# Patient Record
Sex: FEMALE | Race: WHITE | Employment: UNEMPLOYED | URBAN - METROPOLITAN AREA
[De-identification: names, ages, dates, MRNs, and addresses within clinical notes are randomized per-mention and may not be internally consistent; named-entity substitution may affect disease eponyms.]

---

## 2017-04-02 NOTE — ED NOTES
Spoke with pt and gave her referrals that [facility] had sent over to us. Also suggested for patient to look into joining an [employer] [place] [id number] group/hopefully a female group. Pt awake, alert, states she does not want to drink anymore. Discharge to home.

## 2017-04-02 NOTE — ED NOTES
Pt states she feels much less anxious. Family in room. Spoke with pt about possible out patient treatment and she says she would like information about this. Dr. Estella khan.

## 2017-04-02 NOTE — ED NOTES
Pt states she feels anxious, cant eat, sleep. Pt states she drinks daily, has not had a drink for 4 days. Pt denies any suicidal or homicidal thoughts. Pt states she wants help to stop drinking. Pt tearful.

## 2017-04-02 NOTE — ED PROVIDER NOTES
Patient Seen in: THE [facility] Emergency Department In [place]    History   Patient presents with:   Anxiety/Panic attack (neurologic)    Stated Complaint: Panic Attack    HPI    27-year-old female presents to the emergency department stating that she has bee anxious and shaky lying in a gurney. Vital signs were reviewed per nurse's notes. HEENT: Normocephalic atraumatic. Anicteric sclera. Oral mucosa is moist.  Oropharynx is normal.  Neck: No adenopathy or thyromegaly. Lungs are clear to auscultation.   He CULTURE, ROUTINE   CBC W/ DIFFERENTIAL    intravenous access was obtained and the patient was treated with IV fluids and Ativan. I did speak with [facility] and they sent a list of local referrals for the patient.   Because she is unfunded and CI

## 2017-04-02 NOTE — ED INITIAL ASSESSMENT (HPI)
Pt states she is a daily drinker for the past 10 months. Pt states she has been drinking since she was 15. Pt states she has not had a drink for the past 4 days and feels very anxious, shaky, cannot sleep.

## 2017-05-13 NOTE — ED PROVIDER NOTES
Patient Seen in: THE [facility] Emergency Department In [place]    History   Patient presents with:   Anxiety/Panic attack (neurologic)    Stated Complaint: anxiety    HPI    Patient is a 45-year-old female, presenting for evaluation after drinking yesterday e are no rashes. EXTREMITIES: The patient moves all 4 extremities freely. No cyanosis, clubbing, or edema. NEUROLOGIC: The patient is awake, alert, and oriented x3. Cranial nerves are grossly intact.  There is no gross motor or sensory deficits identifie

## 2017-05-13 NOTE — ED INITIAL ASSESSMENT (HPI)
Pt c/o feeling anxious. Sts she was here 2 months ago for same. Trigger both times was drinking the night before. C/O headache and \"feeling foggy\".

## 2022-08-21 NOTE — ED NOTES
Met with pt to discuss options for detox and treatment. Pt outside of [facility] network, but discussed with pt referrals placed in discharge instructions. Pt confirmed understanding. Pt reports history of seizures from withdrawal, and reports taking approx 10mg of xanax daily for approx past 5 years.  Pt denies history of treatment in the past.

## 2022-08-21 NOTE — ED INITIAL ASSESSMENT (HPI)
Pt states \"I want to get off of xanax\". Reports taking xanax for the last 3 years. Last took xanax last night. Pt was also treated in [facility] in April for xanax withdrawal, was discharged with ativan.    Pt reports feeling anxious

## 2023-01-31 ENCOUNTER — HOSPITAL ENCOUNTER (EMERGENCY)
Age: 28
Discharge: HOME | End: 2023-01-31
Attending: EMERGENCY MEDICINE
Payer: COMMERCIAL

## 2023-01-31 VITALS
SYSTOLIC BLOOD PRESSURE: 108 MMHG | HEIGHT: 67 IN | BODY MASS INDEX: 21.97 KG/M2 | WEIGHT: 140 LBS | DIASTOLIC BLOOD PRESSURE: 67 MMHG | TEMPERATURE: 98 F | RESPIRATION RATE: 16 BRPM | HEART RATE: 70 BPM | OXYGEN SATURATION: 99 %

## 2023-01-31 DIAGNOSIS — R11.2 NAUSEA AND VOMITING, UNSPECIFIED VOMITING TYPE: ICD-10-CM

## 2023-01-31 DIAGNOSIS — N30.00 ACUTE CYSTITIS WITHOUT HEMATURIA: Primary | ICD-10-CM

## 2023-01-31 LAB
ALBUMIN SERPL-MCNC: 3.8 G/DL (ref 3.4–5)
ALBUMIN/GLOB SERPL: 1.3 {RATIO} (ref 1–2)
ALP LIVER SERPL-CCNC: 55 U/L
ALT SERPL-CCNC: 36 U/L
ANION GAP SERPL CALC-SCNC: 6 MMOL/L (ref 0–18)
AST SERPL-CCNC: 45 U/L (ref 15–37)
B-HCG UR QL: NEGATIVE
BASOPHILS # BLD AUTO: 0.01 X10(3) UL (ref 0–0.2)
BASOPHILS NFR BLD AUTO: 0.3 %
BILIRUB SERPL-MCNC: 0.8 MG/DL (ref 0.1–2)
BILIRUB UR QL STRIP.AUTO: NEGATIVE
BUN BLD-MCNC: 10 MG/DL (ref 7–18)
CALCIUM BLD-MCNC: 8.9 MG/DL (ref 8.5–10.1)
CHLORIDE SERPL-SCNC: 103 MMOL/L (ref 98–112)
CO2 SERPL-SCNC: 29 MMOL/L (ref 21–32)
COLOR UR AUTO: YELLOW
CREAT BLD-MCNC: 0.93 MG/DL
EOSINOPHIL # BLD AUTO: 0.03 X10(3) UL (ref 0–0.7)
EOSINOPHIL NFR BLD AUTO: 0.9 %
ERYTHROCYTE [DISTWIDTH] IN BLOOD BY AUTOMATED COUNT: 12.6 %
GFR SERPLBLD BASED ON 1.73 SQ M-ARVRAT: 87 ML/MIN/1.73M2 (ref 60–?)
GLOBULIN PLAS-MCNC: 2.9 G/DL (ref 2.8–4.4)
GLUCOSE BLD-MCNC: 88 MG/DL (ref 70–99)
GLUCOSE UR STRIP.AUTO-MCNC: NEGATIVE MG/DL
HCT VFR BLD AUTO: 34.5 %
HGB BLD-MCNC: 11.9 G/DL
IMM GRANULOCYTES # BLD AUTO: 0 X10(3) UL (ref 0–1)
IMM GRANULOCYTES NFR BLD: 0 %
KETONES UR STRIP.AUTO-MCNC: 40 MG/DL
LYMPHOCYTES # BLD AUTO: 1.74 X10(3) UL (ref 1–4)
LYMPHOCYTES NFR BLD AUTO: 52.9 %
MCH RBC QN AUTO: 31.6 PG (ref 26–34)
MCHC RBC AUTO-ENTMCNC: 34.5 G/DL (ref 31–37)
MCV RBC AUTO: 91.8 FL
MONOCYTES # BLD AUTO: 0.33 X10(3) UL (ref 0.1–1)
MONOCYTES NFR BLD AUTO: 10 %
NEUTROPHILS # BLD AUTO: 1.18 X10 (3) UL (ref 1.5–7.7)
NEUTROPHILS # BLD AUTO: 1.18 X10(3) UL (ref 1.5–7.7)
NEUTROPHILS NFR BLD AUTO: 35.9 %
NITRITE UR QL STRIP.AUTO: POSITIVE
OSMOLALITY SERPL CALC.SUM OF ELEC: 284 MOSM/KG (ref 275–295)
PH UR STRIP.AUTO: 7 [PH] (ref 5–8)
PLATELET # BLD AUTO: 171 10(3)UL (ref 150–450)
POTASSIUM SERPL-SCNC: 3.4 MMOL/L (ref 3.5–5.1)
PROT SERPL-MCNC: 6.7 G/DL (ref 6.4–8.2)
RBC # BLD AUTO: 3.76 X10(6)UL
SODIUM SERPL-SCNC: 138 MMOL/L (ref 136–145)
SP GR UR STRIP.AUTO: 1.02 (ref 1–1.03)
UROBILINOGEN UR STRIP.AUTO-MCNC: 1 MG/DL
WBC # BLD AUTO: 3.3 X10(3) UL (ref 4–11)

## 2023-01-31 PROCEDURE — 87086 URINE CULTURE/COLONY COUNT: CPT | Performed by: EMERGENCY MEDICINE

## 2023-01-31 PROCEDURE — 80053 COMPREHEN METABOLIC PANEL: CPT

## 2023-01-31 PROCEDURE — 87088 URINE BACTERIA CULTURE: CPT | Performed by: EMERGENCY MEDICINE

## 2023-01-31 PROCEDURE — 81001 URINALYSIS AUTO W/SCOPE: CPT | Performed by: EMERGENCY MEDICINE

## 2023-01-31 PROCEDURE — 99284 EMERGENCY DEPT VISIT MOD MDM: CPT

## 2023-01-31 PROCEDURE — 81015 MICROSCOPIC EXAM OF URINE: CPT | Performed by: EMERGENCY MEDICINE

## 2023-01-31 PROCEDURE — 96365 THER/PROPH/DIAG IV INF INIT: CPT

## 2023-01-31 PROCEDURE — 87430 STREP A AG IA: CPT | Performed by: EMERGENCY MEDICINE

## 2023-01-31 PROCEDURE — 87186 SC STD MICRODIL/AGAR DIL: CPT | Performed by: EMERGENCY MEDICINE

## 2023-01-31 PROCEDURE — 80053 COMPREHEN METABOLIC PANEL: CPT | Performed by: EMERGENCY MEDICINE

## 2023-01-31 PROCEDURE — 85025 COMPLETE CBC W/AUTO DIFF WBC: CPT

## 2023-01-31 PROCEDURE — 96361 HYDRATE IV INFUSION ADD-ON: CPT

## 2023-01-31 PROCEDURE — 81025 URINE PREGNANCY TEST: CPT

## 2023-01-31 PROCEDURE — 96375 TX/PRO/DX INJ NEW DRUG ADDON: CPT

## 2023-01-31 PROCEDURE — 85025 COMPLETE CBC W/AUTO DIFF WBC: CPT | Performed by: EMERGENCY MEDICINE

## 2023-01-31 RX ORDER — ONDANSETRON 4 MG/1
4 TABLET, ORALLY DISINTEGRATING ORAL EVERY 4 HOURS PRN
Qty: 10 TABLET | Refills: 0 | Status: SHIPPED | OUTPATIENT
Start: 2023-01-31 | End: 2023-02-07

## 2023-01-31 RX ORDER — AZITHROMYCIN 250 MG/1
1000 TABLET, FILM COATED ORAL ONCE
Status: COMPLETED | OUTPATIENT
Start: 2023-01-31 | End: 2023-01-31

## 2023-01-31 RX ORDER — CEPHALEXIN 500 MG/1
500 CAPSULE ORAL 3 TIMES DAILY
Qty: 15 CAPSULE | Refills: 0 | Status: SHIPPED | OUTPATIENT
Start: 2023-01-31 | End: 2023-02-05

## 2023-01-31 RX ORDER — ONDANSETRON 2 MG/ML
4 INJECTION INTRAMUSCULAR; INTRAVENOUS ONCE
Status: COMPLETED | OUTPATIENT
Start: 2023-01-31 | End: 2023-01-31

## 2023-01-31 NOTE — ED INITIAL ASSESSMENT (HPI)
Pt in er for c/o nausea, vomiting, generalizes body aches today, pt reports taking valium, zoloft, norco, and ibuprofen for pain control

## 2023-02-11 ENCOUNTER — HOSPITAL ENCOUNTER (EMERGENCY)
Age: 28
Discharge: HOME | End: 2023-02-11
Attending: EMERGENCY MEDICINE
Payer: COMMERCIAL

## 2023-02-11 VITALS
HEART RATE: 84 BPM | HEIGHT: 67 IN | OXYGEN SATURATION: 98 % | DIASTOLIC BLOOD PRESSURE: 88 MMHG | TEMPERATURE: 98 F | BODY MASS INDEX: 21.97 KG/M2 | WEIGHT: 140 LBS | SYSTOLIC BLOOD PRESSURE: 124 MMHG | RESPIRATION RATE: 16 BRPM

## 2023-02-11 DIAGNOSIS — H81.10 BPPV (BENIGN PAROXYSMAL POSITIONAL VERTIGO), UNSPECIFIED LATERALITY: Primary | ICD-10-CM

## 2023-02-11 LAB
ALBUMIN SERPL-MCNC: 4 G/DL (ref 3.4–5)
ALBUMIN/GLOB SERPL: 1.2 {RATIO} (ref 1–2)
ALP LIVER SERPL-CCNC: 54 U/L
ALT SERPL-CCNC: 105 U/L
ANION GAP SERPL CALC-SCNC: 9 MMOL/L (ref 0–18)
AST SERPL-CCNC: 101 U/L (ref 15–37)
BASOPHILS # BLD AUTO: 0.02 X10(3) UL (ref 0–0.2)
BASOPHILS NFR BLD AUTO: 0.4 %
BILIRUB SERPL-MCNC: 0.5 MG/DL (ref 0.1–2)
BILIRUB UR QL STRIP.AUTO: NEGATIVE
BUN BLD-MCNC: 5 MG/DL (ref 7–18)
CALCIUM BLD-MCNC: 9.2 MG/DL (ref 8.5–10.1)
CHLORIDE SERPL-SCNC: 108 MMOL/L (ref 98–112)
CO2 SERPL-SCNC: 21 MMOL/L (ref 21–32)
COLOR UR AUTO: YELLOW
CREAT BLD-MCNC: 0.81 MG/DL
EOSINOPHIL # BLD AUTO: 0.01 X10(3) UL (ref 0–0.7)
EOSINOPHIL NFR BLD AUTO: 0.2 %
ERYTHROCYTE [DISTWIDTH] IN BLOOD BY AUTOMATED COUNT: 13 %
GFR SERPLBLD BASED ON 1.73 SQ M-ARVRAT: 103 ML/MIN/1.73M2 (ref 60–?)
GLOBULIN PLAS-MCNC: 3.4 G/DL (ref 2.8–4.4)
GLUCOSE BLD-MCNC: 98 MG/DL (ref 70–99)
GLUCOSE UR STRIP.AUTO-MCNC: NEGATIVE MG/DL
HCT VFR BLD AUTO: 39.5 %
HGB BLD-MCNC: 13.7 G/DL
IMM GRANULOCYTES # BLD AUTO: 0 X10(3) UL (ref 0–1)
IMM GRANULOCYTES NFR BLD: 0 %
LEUKOCYTE ESTERASE UR QL STRIP.AUTO: NEGATIVE
LYMPHOCYTES # BLD AUTO: 1.21 X10(3) UL (ref 1–4)
LYMPHOCYTES NFR BLD AUTO: 26 %
MCH RBC QN AUTO: 31.9 PG (ref 26–34)
MCHC RBC AUTO-ENTMCNC: 34.7 G/DL (ref 31–37)
MCV RBC AUTO: 91.9 FL
MONOCYTES # BLD AUTO: 0.48 X10(3) UL (ref 0.1–1)
MONOCYTES NFR BLD AUTO: 10.3 %
NEUTROPHILS # BLD AUTO: 2.94 X10 (3) UL (ref 1.5–7.7)
NEUTROPHILS # BLD AUTO: 2.94 X10(3) UL (ref 1.5–7.7)
NEUTROPHILS NFR BLD AUTO: 63.1 %
NITRITE UR QL STRIP.AUTO: NEGATIVE
OSMOLALITY SERPL CALC.SUM OF ELEC: 283 MOSM/KG (ref 275–295)
PH UR STRIP.AUTO: 7 [PH] (ref 5–8)
PLATELET # BLD AUTO: 187 10(3)UL (ref 150–450)
POTASSIUM SERPL-SCNC: 3.9 MMOL/L (ref 3.5–5.1)
PROT SERPL-MCNC: 7.4 G/DL (ref 6.4–8.2)
RBC # BLD AUTO: 4.3 X10(6)UL
SODIUM SERPL-SCNC: 138 MMOL/L (ref 136–145)
SP GR UR STRIP.AUTO: 1.02 (ref 1–1.03)
UROBILINOGEN UR STRIP.AUTO-MCNC: 0.2 MG/DL
WBC # BLD AUTO: 4.7 X10(3) UL (ref 4–11)

## 2023-02-11 PROCEDURE — 93010 ELECTROCARDIOGRAM REPORT: CPT

## 2023-02-11 PROCEDURE — 99284 EMERGENCY DEPT VISIT MOD MDM: CPT

## 2023-02-11 PROCEDURE — 93005 ELECTROCARDIOGRAM TRACING: CPT

## 2023-02-11 PROCEDURE — 80053 COMPREHEN METABOLIC PANEL: CPT | Performed by: EMERGENCY MEDICINE

## 2023-02-11 PROCEDURE — 96375 TX/PRO/DX INJ NEW DRUG ADDON: CPT

## 2023-02-11 PROCEDURE — 81015 MICROSCOPIC EXAM OF URINE: CPT | Performed by: EMERGENCY MEDICINE

## 2023-02-11 PROCEDURE — 85025 COMPLETE CBC W/AUTO DIFF WBC: CPT | Performed by: EMERGENCY MEDICINE

## 2023-02-11 PROCEDURE — 81001 URINALYSIS AUTO W/SCOPE: CPT | Performed by: EMERGENCY MEDICINE

## 2023-02-11 PROCEDURE — 96374 THER/PROPH/DIAG INJ IV PUSH: CPT

## 2023-02-11 PROCEDURE — 96361 HYDRATE IV INFUSION ADD-ON: CPT

## 2023-02-11 RX ORDER — MECLIZINE HYDROCHLORIDE 25 MG/1
25 TABLET ORAL 3 TIMES DAILY PRN
Qty: 20 TABLET | Refills: 0 | Status: SHIPPED | OUTPATIENT
Start: 2023-02-11 | End: 2023-02-20

## 2023-02-11 RX ORDER — HYDROXYZINE HYDROCHLORIDE 25 MG/1
TABLET, FILM COATED ORAL EVERY 6 HOURS PRN
Qty: 20 TABLET | Refills: 0 | Status: SHIPPED | OUTPATIENT
Start: 2023-02-11 | End: 2023-02-20

## 2023-02-11 RX ORDER — MECLIZINE HYDROCHLORIDE 25 MG/1
25 TABLET ORAL ONCE
Status: COMPLETED | OUTPATIENT
Start: 2023-02-11 | End: 2023-02-11

## 2023-02-11 RX ORDER — METOCLOPRAMIDE HYDROCHLORIDE 5 MG/ML
10 INJECTION INTRAMUSCULAR; INTRAVENOUS ONCE
Status: COMPLETED | OUTPATIENT
Start: 2023-02-11 | End: 2023-02-11

## 2023-02-11 RX ORDER — METOCLOPRAMIDE 10 MG/1
10 TABLET ORAL 3 TIMES DAILY PRN
Qty: 20 TABLET | Refills: 0 | Status: SHIPPED | OUTPATIENT
Start: 2023-02-11 | End: 2023-02-20

## 2023-02-11 RX ORDER — DIPHENHYDRAMINE HYDROCHLORIDE 50 MG/ML
25 INJECTION INTRAMUSCULAR; INTRAVENOUS ONCE
Status: COMPLETED | OUTPATIENT
Start: 2023-02-11 | End: 2023-02-11

## 2023-02-11 RX ORDER — PROCHLORPERAZINE MALEATE 10 MG
10 TABLET ORAL ONCE
Status: COMPLETED | OUTPATIENT
Start: 2023-02-11 | End: 2023-02-11

## 2023-02-11 NOTE — ED INITIAL ASSESSMENT (HPI)
Patient to er with c/o dizziness and vomiting. Patient states that they stopped her Keppra about 1 month ago, started her on Zoloft- was on it for 1 month stopped for 2 weeks and just started taking it again.  Patient states that she also took Xanax PTA

## 2023-02-12 LAB
ATRIAL RATE: 85 BPM
P AXIS: 48 DEGREES
P-R INTERVAL: 122 MS
Q-T INTERVAL: 362 MS
QRS DURATION: 62 MS
QTC CALCULATION (BEZET): 430 MS
R AXIS: 72 DEGREES
T AXIS: 76 DEGREES
VENTRICULAR RATE: 85 BPM

## 2023-02-13 PROBLEM — F13.20 SEDATIVE, HYPNOTIC OR ANXIOLYTIC USE DISORDER, SEVERE, DEPENDENCE (HCC): Status: ACTIVE | Noted: 2023-02-13

## 2023-04-04 ENCOUNTER — APPOINTMENT (OUTPATIENT)
Dept: CT IMAGING | Age: 28
End: 2023-04-04
Attending: EMERGENCY MEDICINE
Payer: COMMERCIAL

## 2023-04-04 ENCOUNTER — HOSPITAL ENCOUNTER (EMERGENCY)
Age: 28
Discharge: HOME | End: 2023-04-04
Attending: EMERGENCY MEDICINE
Payer: COMMERCIAL

## 2023-04-04 VITALS
WEIGHT: 140 LBS | TEMPERATURE: 98 F | OXYGEN SATURATION: 99 % | HEIGHT: 67 IN | BODY MASS INDEX: 21.97 KG/M2 | RESPIRATION RATE: 18 BRPM | SYSTOLIC BLOOD PRESSURE: 116 MMHG | DIASTOLIC BLOOD PRESSURE: 85 MMHG | HEART RATE: 88 BPM

## 2023-04-04 DIAGNOSIS — S09.8XXA BLUNT HEAD INJURY, INITIAL ENCOUNTER: ICD-10-CM

## 2023-04-04 DIAGNOSIS — R42 DIZZINESS: Primary | ICD-10-CM

## 2023-04-04 PROBLEM — F19.90 SUBSTANCE USE DISORDER: Status: ACTIVE | Noted: 2023-04-04

## 2023-04-04 LAB
ALBUMIN SERPL-MCNC: 4 G/DL (ref 3.4–5)
ALBUMIN/GLOB SERPL: 1.2 {RATIO} (ref 1–2)
ALP LIVER SERPL-CCNC: 52 U/L
ALT SERPL-CCNC: 21 U/L
ANION GAP SERPL CALC-SCNC: 10 MMOL/L (ref 0–18)
AST SERPL-CCNC: 18 U/L (ref 15–37)
BASOPHILS # BLD AUTO: 0.03 X10(3) UL (ref 0–0.2)
BASOPHILS NFR BLD AUTO: 0.8 %
BILIRUB SERPL-MCNC: 0.9 MG/DL (ref 0.1–2)
BUN BLD-MCNC: 10 MG/DL (ref 7–18)
CALCIUM BLD-MCNC: 9.6 MG/DL (ref 8.5–10.1)
CHLORIDE SERPL-SCNC: 105 MMOL/L (ref 98–112)
CO2 SERPL-SCNC: 21 MMOL/L (ref 21–32)
CREAT BLD-MCNC: 0.88 MG/DL
EOSINOPHIL # BLD AUTO: 0.02 X10(3) UL (ref 0–0.7)
EOSINOPHIL NFR BLD AUTO: 0.5 %
ERYTHROCYTE [DISTWIDTH] IN BLOOD BY AUTOMATED COUNT: 12.9 %
GFR SERPLBLD BASED ON 1.73 SQ M-ARVRAT: 93 ML/MIN/1.73M2 (ref 60–?)
GLOBULIN PLAS-MCNC: 3.3 G/DL (ref 2.8–4.4)
GLUCOSE BLD-MCNC: 97 MG/DL (ref 70–99)
HCT VFR BLD AUTO: 35 %
HGB BLD-MCNC: 12.6 G/DL
IMM GRANULOCYTES # BLD AUTO: 0 X10(3) UL (ref 0–1)
IMM GRANULOCYTES NFR BLD: 0 %
LYMPHOCYTES # BLD AUTO: 1.43 X10(3) UL (ref 1–4)
LYMPHOCYTES NFR BLD AUTO: 39.3 %
MCH RBC QN AUTO: 32.4 PG (ref 26–34)
MCHC RBC AUTO-ENTMCNC: 36 G/DL (ref 31–37)
MCV RBC AUTO: 90 FL
MONOCYTES # BLD AUTO: 0.4 X10(3) UL (ref 0.1–1)
MONOCYTES NFR BLD AUTO: 11 %
NEUTROPHILS # BLD AUTO: 1.76 X10 (3) UL (ref 1.5–7.7)
NEUTROPHILS # BLD AUTO: 1.76 X10(3) UL (ref 1.5–7.7)
NEUTROPHILS NFR BLD AUTO: 48.4 %
OSMOLALITY SERPL CALC.SUM OF ELEC: 281 MOSM/KG (ref 275–295)
PLATELET # BLD AUTO: 197 10(3)UL (ref 150–450)
POTASSIUM SERPL-SCNC: 3.3 MMOL/L (ref 3.5–5.1)
PROT SERPL-MCNC: 7.3 G/DL (ref 6.4–8.2)
RBC # BLD AUTO: 3.89 X10(6)UL
SODIUM SERPL-SCNC: 136 MMOL/L (ref 136–145)
WBC # BLD AUTO: 3.6 X10(3) UL (ref 4–11)

## 2023-04-04 PROCEDURE — 70450 CT HEAD/BRAIN W/O DYE: CPT | Performed by: EMERGENCY MEDICINE

## 2023-04-04 PROCEDURE — 96375 TX/PRO/DX INJ NEW DRUG ADDON: CPT

## 2023-04-04 PROCEDURE — 99285 EMERGENCY DEPT VISIT HI MDM: CPT

## 2023-04-04 PROCEDURE — 85025 COMPLETE CBC W/AUTO DIFF WBC: CPT | Performed by: EMERGENCY MEDICINE

## 2023-04-04 PROCEDURE — 96374 THER/PROPH/DIAG INJ IV PUSH: CPT

## 2023-04-04 PROCEDURE — 80053 COMPREHEN METABOLIC PANEL: CPT | Performed by: EMERGENCY MEDICINE

## 2023-04-04 PROCEDURE — 96361 HYDRATE IV INFUSION ADD-ON: CPT

## 2023-04-04 RX ORDER — HYDROXYZINE 50 MG/1
50 TABLET, FILM COATED ORAL 3 TIMES DAILY PRN
COMMUNITY
End: 2023-04-11

## 2023-04-04 RX ORDER — SODIUM CHLORIDE 9 MG/ML
INJECTION, SOLUTION INTRAVENOUS ONCE
Status: COMPLETED | OUTPATIENT
Start: 2023-04-04 | End: 2023-04-04

## 2023-04-04 RX ORDER — METOCLOPRAMIDE HYDROCHLORIDE 5 MG/ML
10 INJECTION INTRAMUSCULAR; INTRAVENOUS ONCE
Status: COMPLETED | OUTPATIENT
Start: 2023-04-04 | End: 2023-04-04

## 2023-04-04 RX ORDER — MECLIZINE HYDROCHLORIDE 25 MG/1
25 TABLET ORAL 3 TIMES DAILY PRN
Qty: 20 TABLET | Refills: 0 | Status: SHIPPED | OUTPATIENT
Start: 2023-04-04 | End: 2023-04-11

## 2023-04-04 RX ORDER — METOCLOPRAMIDE 10 MG/1
10 TABLET ORAL 3 TIMES DAILY PRN
Qty: 20 TABLET | Refills: 0 | Status: SHIPPED | OUTPATIENT
Start: 2023-04-04 | End: 2023-04-11

## 2023-04-04 RX ORDER — DIPHENHYDRAMINE HYDROCHLORIDE 50 MG/ML
25 INJECTION INTRAMUSCULAR; INTRAVENOUS ONCE
Status: COMPLETED | OUTPATIENT
Start: 2023-04-04 | End: 2023-04-04

## 2023-04-04 NOTE — ED INITIAL ASSESSMENT (HPI)
C/o dizziness-room spinning, shaking, chest tightness, feels foggy- can't concentrate.  was admitted on Friday for altered mental status from a fall, states she was intoxicated. No one witnessed the fall. She was discharged on Sat. Refused CT scan at that time. Requested head CT now.

## 2023-04-04 NOTE — ED NOTES
Got a phone call from RN, requesting outpt CD resources for this pt. [facility] provided the resources under MD inst. Instructed pt to call them directly in order to make an appointment.

## 2023-04-04 NOTE — ED QUICK NOTES
Pt requested a detox referral- she said that she doesn't want outpatient. [facility] was called by Katherine Fitzgerald RN.

## 2023-04-05 PROBLEM — F10.20 ALCOHOL USE DISORDER, SEVERE, DEPENDENCE (HCC): Status: ACTIVE | Noted: 2023-04-05

## 2023-12-09 ENCOUNTER — HOSPITAL ENCOUNTER (EMERGENCY)
Age: 28
Discharge: HOME | End: 2023-12-09
Attending: EMERGENCY MEDICINE
Payer: MEDICAID

## 2023-12-09 VITALS
WEIGHT: 140 LBS | BODY MASS INDEX: 21.97 KG/M2 | HEIGHT: 67 IN | RESPIRATION RATE: 18 BRPM | OXYGEN SATURATION: 99 % | TEMPERATURE: 101 F | DIASTOLIC BLOOD PRESSURE: 90 MMHG | SYSTOLIC BLOOD PRESSURE: 126 MMHG | HEART RATE: 99 BPM

## 2023-12-09 DIAGNOSIS — J10.1 INFLUENZA A: Primary | ICD-10-CM

## 2023-12-09 LAB
POCT INFLUENZA A: POSITIVE
POCT INFLUENZA B: NEGATIVE
SARS-COV-2 RNA RESP QL NAA+PROBE: NOT DETECTED

## 2023-12-09 PROCEDURE — 87502 INFLUENZA DNA AMP PROBE: CPT | Performed by: EMERGENCY MEDICINE

## 2023-12-09 PROCEDURE — 99283 EMERGENCY DEPT VISIT LOW MDM: CPT

## 2023-12-09 PROCEDURE — 87430 STREP A AG IA: CPT | Performed by: EMERGENCY MEDICINE

## 2023-12-09 PROCEDURE — 99284 EMERGENCY DEPT VISIT MOD MDM: CPT

## 2023-12-09 PROCEDURE — 87430 STREP A AG IA: CPT

## 2023-12-09 RX ORDER — PSEUDOEPHEDRINE HCL 30 MG
30 TABLET ORAL EVERY 4 HOURS PRN
Qty: 36 TABLET | Refills: 0 | Status: SHIPPED | OUTPATIENT
Start: 2023-12-09 | End: 2024-01-08

## 2023-12-09 RX ORDER — ACETAMINOPHEN 500 MG
1000 TABLET ORAL ONCE
Status: COMPLETED | OUTPATIENT
Start: 2023-12-09 | End: 2023-12-09

## 2023-12-09 RX ORDER — BENZONATATE 100 MG/1
100 CAPSULE ORAL 3 TIMES DAILY PRN
Qty: 30 CAPSULE | Refills: 0 | Status: SHIPPED | OUTPATIENT
Start: 2023-12-09 | End: 2024-01-08

## 2023-12-09 RX ORDER — ALBUTEROL SULFATE 90 UG/1
2 AEROSOL, METERED RESPIRATORY (INHALATION) EVERY 4 HOURS PRN
Qty: 1 EACH | Refills: 0 | Status: SHIPPED | OUTPATIENT
Start: 2023-12-09 | End: 2024-01-08

## 2023-12-09 RX ORDER — OXYMETAZOLINE HYDROCHLORIDE 0.05 G/100ML
1 SPRAY NASAL EVERY 4 HOURS PRN
Qty: 15 ML | Refills: 0 | Status: SHIPPED | OUTPATIENT
Start: 2023-12-09 | End: 2024-01-08

## 2023-12-09 RX ORDER — IBUPROFEN 600 MG/1
600 TABLET ORAL EVERY 8 HOURS PRN
Qty: 21 TABLET | Refills: 0 | Status: SHIPPED | OUTPATIENT
Start: 2023-12-09 | End: 2023-12-16

## 2024-01-25 ENCOUNTER — APPOINTMENT (OUTPATIENT)
Dept: CT IMAGING | Age: 29
End: 2024-01-25
Attending: EMERGENCY MEDICINE
Payer: MEDICAID

## 2024-01-25 PROCEDURE — 70450 CT HEAD/BRAIN W/O DYE: CPT | Performed by: EMERGENCY MEDICINE

## 2024-09-15 ENCOUNTER — APPOINTMENT (OUTPATIENT)
Dept: GENERAL RADIOLOGY | Age: 29
End: 2024-09-15
Attending: EMERGENCY MEDICINE
Payer: MEDICAID

## 2024-09-15 ENCOUNTER — HOSPITAL ENCOUNTER (INPATIENT)
Facility: HOSPITAL | Age: 29
LOS: 4 days | Discharge: NURSING, REHAB OR LONG-TERM CARE | End: 2024-09-19
Attending: EMERGENCY MEDICINE | Admitting: HOSPITALIST
Payer: MEDICAID

## 2024-09-15 DIAGNOSIS — N30.01 ACUTE CYSTITIS WITH HEMATURIA: ICD-10-CM

## 2024-09-15 DIAGNOSIS — F10.930 ALCOHOL WITHDRAWAL SYNDROME WITHOUT COMPLICATION (HCC): Primary | ICD-10-CM

## 2024-09-15 DIAGNOSIS — R45.851 SUICIDAL IDEATION: ICD-10-CM

## 2024-09-15 LAB
ALBUMIN SERPL-MCNC: 3.7 G/DL (ref 3.4–5)
ALBUMIN/GLOB SERPL: 0.9 {RATIO} (ref 1–2)
ALP LIVER SERPL-CCNC: 107 U/L
ALT SERPL-CCNC: 20 U/L
AMPHET UR QL SCN: NEGATIVE
ANION GAP SERPL CALC-SCNC: 8 MMOL/L (ref 0–18)
APAP SERPL-MCNC: 6.5 UG/ML (ref 10–30)
AST SERPL-CCNC: 20 U/L (ref 15–37)
ATRIAL RATE: 82 BPM
B-HCG UR QL: NEGATIVE
BASOPHILS # BLD AUTO: 0.07 X10(3) UL (ref 0–0.2)
BASOPHILS NFR BLD AUTO: 0.6 %
BILIRUB SERPL-MCNC: 0.9 MG/DL (ref 0.1–2)
BILIRUB UR QL CFM: NEGATIVE
BUN BLD-MCNC: 11 MG/DL (ref 9–23)
CALCIUM BLD-MCNC: 9.2 MG/DL (ref 8.5–10.1)
CANNABINOIDS UR QL SCN: NEGATIVE
CHLORIDE SERPL-SCNC: 100 MMOL/L (ref 98–112)
CO2 SERPL-SCNC: 26 MMOL/L (ref 21–32)
COLOR UR AUTO: YELLOW
CREAT BLD-MCNC: 0.8 MG/DL
CREAT UR-SCNC: 418 MG/DL
EGFRCR SERPLBLD CKD-EPI 2021: 103 ML/MIN/1.73M2 (ref 60–?)
EOSINOPHIL # BLD AUTO: 0 X10(3) UL (ref 0–0.7)
EOSINOPHIL NFR BLD AUTO: 0 %
ERYTHROCYTE [DISTWIDTH] IN BLOOD BY AUTOMATED COUNT: 13.3 %
ETHANOL SERPL-MCNC: 108 MG/DL (ref ?–3)
GLOBULIN PLAS-MCNC: 4.1 G/DL (ref 2.8–4.4)
GLUCOSE BLD-MCNC: 192 MG/DL (ref 70–99)
GLUCOSE BLD-MCNC: 98 MG/DL (ref 70–99)
GLUCOSE UR STRIP.AUTO-MCNC: NEGATIVE MG/DL
GRAN CASTS #/AREA URNS LPF: PRESENT /LPF
HCT VFR BLD AUTO: 35 %
HGB BLD-MCNC: 11.7 G/DL
HYALINE CASTS #/AREA URNS AUTO: PRESENT /LPF
IMM GRANULOCYTES # BLD AUTO: 0.03 X10(3) UL (ref 0–1)
IMM GRANULOCYTES NFR BLD: 0.3 %
KETONES UR STRIP.AUTO-MCNC: NEGATIVE MG/DL
LEUKOCYTE ESTERASE UR QL STRIP.AUTO: NEGATIVE
LIPASE SERPL-CCNC: 33 U/L (ref 12–53)
LYMPHOCYTES # BLD AUTO: 0.97 X10(3) UL (ref 1–4)
LYMPHOCYTES NFR BLD AUTO: 9 %
MAGNESIUM SERPL-MCNC: 1.7 MG/DL (ref 1.6–2.6)
MCH RBC QN AUTO: 30.2 PG (ref 26–34)
MCHC RBC AUTO-ENTMCNC: 33.4 G/DL (ref 31–37)
MCV RBC AUTO: 90.4 FL
MDMA UR QL SCN: NEGATIVE
MONOCYTES # BLD AUTO: 0.8 X10(3) UL (ref 0.1–1)
MONOCYTES NFR BLD AUTO: 7.4 %
NEUTROPHILS # BLD AUTO: 8.93 X10 (3) UL (ref 1.5–7.7)
NEUTROPHILS # BLD AUTO: 8.93 X10(3) UL (ref 1.5–7.7)
NEUTROPHILS NFR BLD AUTO: 82.7 %
NITRITE UR QL STRIP.AUTO: POSITIVE
OPIATES UR QL SCN: NEGATIVE
OSMOLALITY SERPL CALC.SUM OF ELEC: 277 MOSM/KG (ref 275–295)
P AXIS: 57 DEGREES
P-R INTERVAL: 120 MS
PH UR STRIP.AUTO: 5.5 [PH] (ref 5–8)
PLATELET # BLD AUTO: 373 10(3)UL (ref 150–450)
POTASSIUM SERPL-SCNC: 3.5 MMOL/L (ref 3.5–5.1)
PROT SERPL-MCNC: 7.8 G/DL (ref 6.4–8.2)
PROT UR STRIP.AUTO-MCNC: >=300 MG/DL
Q-T INTERVAL: 404 MS
QRS DURATION: 72 MS
QTC CALCULATION (BEZET): 472 MS
R AXIS: 44 DEGREES
RBC # BLD AUTO: 3.87 X10(6)UL
SALICYLATES SERPL-MCNC: <1.7 MG/DL (ref 2.8–20)
SARS-COV-2 RNA RESP QL NAA+PROBE: NOT DETECTED
SODIUM SERPL-SCNC: 134 MMOL/L (ref 136–145)
SP GR UR STRIP.AUTO: >=1.03 (ref 1–1.03)
T AXIS: 51 DEGREES
UROBILINOGEN UR STRIP.AUTO-MCNC: 0.2 MG/DL
VENTRICULAR RATE: 82 BPM
WBC # BLD AUTO: 10.8 X10(3) UL (ref 4–11)

## 2024-09-15 PROCEDURE — 73610 X-RAY EXAM OF ANKLE: CPT | Performed by: EMERGENCY MEDICINE

## 2024-09-15 PROCEDURE — HZ2ZZZZ DETOXIFICATION SERVICES FOR SUBSTANCE ABUSE TREATMENT: ICD-10-PCS | Performed by: HOSPITALIST

## 2024-09-15 PROCEDURE — 73590 X-RAY EXAM OF LOWER LEG: CPT | Performed by: EMERGENCY MEDICINE

## 2024-09-15 PROCEDURE — 99223 1ST HOSP IP/OBS HIGH 75: CPT | Performed by: HOSPITALIST

## 2024-09-15 RX ORDER — THIAMINE HYDROCHLORIDE 100 MG/ML
100 INJECTION, SOLUTION INTRAMUSCULAR; INTRAVENOUS ONCE
Status: COMPLETED | OUTPATIENT
Start: 2024-09-15 | End: 2024-09-15

## 2024-09-15 RX ORDER — LORAZEPAM 1 MG/1
2 TABLET ORAL EVERY 30 MIN PRN
Status: DISCONTINUED | OUTPATIENT
Start: 2024-09-15 | End: 2024-09-15

## 2024-09-15 RX ORDER — PHENOBARBITAL SODIUM 130 MG/ML
130 INJECTION, SOLUTION INTRAMUSCULAR; INTRAVENOUS ONCE
Status: COMPLETED | OUTPATIENT
Start: 2024-09-15 | End: 2024-09-15

## 2024-09-15 RX ORDER — METOPROLOL TARTRATE 25 MG/1
25 TABLET, FILM COATED ORAL 2 TIMES DAILY PRN
Status: DISCONTINUED | OUTPATIENT
Start: 2024-09-15 | End: 2024-09-20

## 2024-09-15 RX ORDER — PROCHLORPERAZINE EDISYLATE 5 MG/ML
5 INJECTION INTRAMUSCULAR; INTRAVENOUS EVERY 8 HOURS PRN
Status: DISCONTINUED | OUTPATIENT
Start: 2024-09-15 | End: 2024-09-17

## 2024-09-15 RX ORDER — ACETAMINOPHEN 500 MG
500 TABLET ORAL EVERY 4 HOURS PRN
Status: DISCONTINUED | OUTPATIENT
Start: 2024-09-15 | End: 2024-09-20

## 2024-09-15 RX ORDER — ONDANSETRON 2 MG/ML
4 INJECTION INTRAMUSCULAR; INTRAVENOUS ONCE
Status: COMPLETED | OUTPATIENT
Start: 2024-09-15 | End: 2024-09-15

## 2024-09-15 RX ORDER — ACETAMINOPHEN 500 MG
1000 TABLET ORAL ONCE
Status: COMPLETED | OUTPATIENT
Start: 2024-09-15 | End: 2024-09-15

## 2024-09-15 RX ORDER — LORAZEPAM 2 MG/ML
2 INJECTION INTRAMUSCULAR ONCE
Status: COMPLETED | OUTPATIENT
Start: 2024-09-15 | End: 2024-09-15

## 2024-09-15 RX ORDER — SULFAMETHOXAZOLE/TRIMETHOPRIM 800-160 MG
1 TABLET ORAL ONCE
Status: COMPLETED | OUTPATIENT
Start: 2024-09-15 | End: 2024-09-15

## 2024-09-15 RX ORDER — ONDANSETRON 2 MG/ML
4 INJECTION INTRAMUSCULAR; INTRAVENOUS EVERY 6 HOURS PRN
Status: DISCONTINUED | OUTPATIENT
Start: 2024-09-15 | End: 2024-09-17

## 2024-09-15 RX ORDER — LORAZEPAM 1 MG/1
1 TABLET ORAL
Status: DISCONTINUED | OUTPATIENT
Start: 2024-09-15 | End: 2024-09-20

## 2024-09-15 RX ORDER — MELATONIN
3 NIGHTLY PRN
Status: DISCONTINUED | OUTPATIENT
Start: 2024-09-15 | End: 2024-09-20

## 2024-09-15 RX ORDER — ECHINACEA PURPUREA EXTRACT 125 MG
1 TABLET ORAL
Status: DISCONTINUED | OUTPATIENT
Start: 2024-09-15 | End: 2024-09-20

## 2024-09-15 RX ORDER — SODIUM CHLORIDE 9 MG/ML
INJECTION, SOLUTION INTRAVENOUS CONTINUOUS
Status: DISCONTINUED | OUTPATIENT
Start: 2024-09-15 | End: 2024-09-20

## 2024-09-15 RX ORDER — FOLIC ACID 1 MG/1
1 TABLET ORAL DAILY
Status: DISCONTINUED | OUTPATIENT
Start: 2024-09-15 | End: 2024-09-20

## 2024-09-15 RX ORDER — KETOROLAC TROMETHAMINE 15 MG/ML
15 INJECTION, SOLUTION INTRAMUSCULAR; INTRAVENOUS ONCE
Status: COMPLETED | OUTPATIENT
Start: 2024-09-15 | End: 2024-09-15

## 2024-09-15 RX ORDER — ENOXAPARIN SODIUM 100 MG/ML
40 INJECTION SUBCUTANEOUS DAILY
Status: DISCONTINUED | OUTPATIENT
Start: 2024-09-16 | End: 2024-09-20

## 2024-09-15 RX ORDER — MAGNESIUM HYDROXIDE/ALUMINUM HYDROXICE/SIMETHICONE 120; 1200; 1200 MG/30ML; MG/30ML; MG/30ML
30 SUSPENSION ORAL ONCE
Status: COMPLETED | OUTPATIENT
Start: 2024-09-15 | End: 2024-09-15

## 2024-09-15 RX ORDER — MULTIPLE VITAMINS W/ MINERALS TAB 9MG-400MCG
1 TAB ORAL DAILY
Status: DISCONTINUED | OUTPATIENT
Start: 2024-09-15 | End: 2024-09-20

## 2024-09-15 RX ORDER — MELATONIN
100 DAILY
Status: DISCONTINUED | OUTPATIENT
Start: 2024-09-16 | End: 2024-09-20

## 2024-09-15 RX ORDER — LORAZEPAM 1 MG/1
2 TABLET ORAL
Status: DISCONTINUED | OUTPATIENT
Start: 2024-09-15 | End: 2024-09-20

## 2024-09-15 NOTE — ED PROVIDER NOTES
Patient Seen in: [facility] In [place]      History     Chief Complaint   Patient presents with    Leg or Foot Injury    Eval-D     Stated Complaint: right leg surgery, has had two falls on crutches, pain worse    Subjective:   HPI    29-year-old with a history of anxiety, bipolar affective disorder, alcohol use disorder presents for evaluation of leg pain as well as requesting detox from alcohol.  Patient had a R tib/fib fracture and had surgery a month ago. Has fallen a few times on her crutches and feels she has re injured it. Has pain in her R shin and ankle.  Patient went on a binge and has been drinking a fifth of vodka daily for 5 days. Prior to that, was drinking on and off, tends to drink in a binge pattern. Reports 2-3 prior withdrawal seizures; last one was 1 year ago. Has had DTs in the past, as well. Feels shaky and has been vomiting. Feels dizzy. Has had some thoughts of suicide and reports prior attempts.  States she does not feel safe going home.  Has been to [facility] in [facility] in the past.   DAVIDE = 8 hours ago  CIWA = 12  Outside records reviewed patient seen at [facility] on 8/28 and 8/29.  On 8/28 she was there for alcohol use disorder and psychiatric eval.  On 8/29 she was seen for shortness of breath and \"feeling like she was drugged\".  She was able to be discharged both times.  Objective:   Past Medical History:    Anxiety    Bipolar affective (HCC)    Depression              Past Surgical History:   Procedure Laterality Date    Fracture surgery                  Social History     Socioeconomic History    Marital status: Single   Tobacco Use    Smoking status: Never    Smokeless tobacco: Never   Vaping Use    Vaping status: Never Used   Substance and Sexual Activity    Alcohol use: Yes    Drug use: Not Currently     Frequency: 5.0 times per week     Types: benzodiazepines     Comment: Using Xanax 3-4 times a week, 2-6mg     Social Determinants of Health      Financial Resource Strain: Low Risk  (8/5/2024)    Received from [facility]    Overall Financial Resource Strain (CARDIA)     Difficulty of Paying Living Expenses: Not hard at all   Food Insecurity: No Food Insecurity (8/5/2024)    Received from [facility]    Hunger Vital Sign     Worried About Running Out of Food in the Last Year: Never true     Ran Out of Food in the Last Year: Never true   Transportation Needs: Unmet Transportation Needs (8/5/2024)    Received from [facility]    PRAPARE - Transportation     Lack of Transportation (Medical): No     Lack of Transportation (Non-Medical): Yes   Housing Stability: Low Risk  (8/5/2024)    Received from [facility]    Housing Stability Vital Sign     Unable to Pay for Housing in the Last Year: No     Number of Places Lived in the Last Year: 1     Unstable Housing in the Last Year: No              Review of Systems    Positive for stated Chief Complaint: Leg or Foot Injury and Eval-D    Other systems are as noted in HPI.  Constitutional and vital signs reviewed.      All other systems reviewed and negative except as noted above.    Physical Exam     ED Triage Vitals [09/15/24 0813]   BP (!) 136/98   Pulse 104   Resp 16   Temp 98 °F (36.7 °C)   Temp src    SpO2 98 %   O2 Device None (Room air)       Current Vitals:   Vital Signs  BP: (!) 124/94  Pulse: 99  Resp: 16  Temp: 98 °F (36.7 °C)    Oxygen Therapy  SpO2: 98 %  O2 Device: None (Room air)            Physical Exam    General: Patient is awake, alert, actively vomiting  HEENT:  Sclera are not icteric.  Conjunctivae within normal limits.  Mucous members are mild to moderately dry.   Cardiovascular: Regular rate and rhythm, normal S1-S2.  Respiratory: Lungs are clear to auscultation bilaterally.   Abdomen: Soft, nontender, nondistended  Extremities: There is mild tenderness to the right anterior shin.  No deformity.  Mild  tenderness to the right ankle without deformity.  No edema.  No unilateral calf swelling or calf tenderness to palpation.  Neuro: Cranial nerves II through XII are intact bilaterally.  Normal strength and sensation bilateral UE and LE.  Patient is alert and answers questions appropriately.  She has mild to moderately tremulous.  Psychiatric: No apparent hallucinations or delusions.  No pressured speech or flight of ideas.  Patient is not agitated.    ED Course     Labs Reviewed   COMP METABOLIC PANEL (14) - Abnormal; Notable for the following components:       Result Value    Sodium 134 (*)     Alkaline Phosphatase 107 (*)     A/G Ratio 0.9 (*)     All other components within normal limits   CBC WITH DIFFERENTIAL WITH PLATELET - Abnormal; Notable for the following components:    HGB 11.7 (*)     Neutrophil Absolute Prelim 8.93 (*)     Neutrophil Absolute 8.93 (*)     Lymphocyte Absolute 0.97 (*)     All other components within normal limits   ETHYL ALCOHOL - Abnormal; Notable for the following components:    Ethyl Alcohol 108 (*)     All other components within normal limits   DRUG SCREEN 8 W/OUT CONFIRMATION, URINE - Abnormal; Notable for the following components:    Benzodiazepines Urine Presumed Positive (*)     Cocaine Urine Presumed Positive (*)     All other components within normal limits    Narrative:     Results of the Urine Drug Screen should be used only for medical purposes.   ACETAMINOPHEN (TYLENOL), S - Abnormal; Notable for the following components:    Acetaminophen 6.5 (*)     All other components within normal limits   SALICYLATE, SERUM - Abnormal; Notable for the following components:    Salicylate <1.7 (*)     All other components within normal limits   URINALYSIS, ROUTINE - Abnormal; Notable for the following components:    Clarity Urine Slightly Cloudy (*)     Blood Urine Small (*)     Protein Urine >=300 (*)     Nitrite Urine Positive (*)     All other components within normal limits   UA  MICROSCOPIC ONLY, URINE - Abnormal; Notable for the following components:    WBC Urine 21-50 (*)     RBC Urine 3-5 (*)     Bacteria Urine 2+ (*)     Squamous Epi. Cells Moderate (*)     Renal Tubular Epithelial Cells Few (*)     Hyaline Casts Present (*)     Granular Casts Present (*)     All other components within normal limits   MAGNESIUM - Normal   LIPASE - Normal   ICTOTEST - Normal   POCT PREGNANCY URINE - Normal   SARS-COV-2 BY PCR (GENEXPERT) - Normal     EKG    Rate, intervals and axes as noted on EKG Report.  Rate: 82  Rhythm: Sinus Rhythm  Reading: No ST elevation or depression.  No dysrhythmia.  Normal intervals including QTc 472      Right tib-fib: No evidence of hardware complication.  Persistent lucency of the fracture plane compatible with incomplete osseous bridging.  Nondisplaced fracture through the proximal fibular shaft with moderate callus formation, however with persistent lucency of the fracture plane compatible with incomplete osseous bridging  Right ankle: I personally reviewed the radiographs and my individual interpretation shows ankle mortise preserved.  I also reviewed the official report which showed partially visualized fracture of the distal tibial shaft.        Fluids, Zofran Ativan, Toradol ordered  Tylenol for pain  First dose of Bactrim given for suspected UTI and urinalysis       MDM          Previous records reviewed as noted in HPI    Differential includes, but is not limited to, hypokalemia, alcohol withdrawal, suicide attempt    Review of any laboratory testing: CBC normal.  CMP with minimal hyponatremia.  EtOH 108.  Magnesium 1.7.  Acetaminophen/salicylate noncontributory.  Lipase normal.  Drug screen positive for benzos and cocaine. Pregnancy test negative.  Urinalysis contaminated but could be consistent with infection.    Shared decision making with the patient.  On reevaluation patient has some improvement in her symptoms.  She continues to have suicidal thoughts and  does not feel safe going home.  At this point she is medically cleared for inpatient psychiatric care and inpatient detox.  Does not require medical hospitalization.    Consultants utilized:   JUNIE Ramos                             Medical Decision Making      Disposition and Plan     Clinical Impression:  1. Alcohol withdrawal syndrome without complication (HCC)    2. Suicidal ideation    3. Acute cystitis with hematuria         Disposition:  Psychiatric transfer  9/15/2024 11:18 am    Follow-up:  No follow-up provider specified.        Medications Prescribed:  There are no discharge medications for this patient.

## 2024-09-15 NOTE — BH PROGRESS NOTE
Writer began placement search for IP programs.   [facility] - OON  [facility] - Out of scope of care  [facility] - No F beds  [place] - No beds     Writer began to search outside of system.     [facility] - Deflected d/t acuity, may retry 9/16 after 9am  [facility] - Deflected due to 1:1 needs  [facility] - Deflected d/t acuity. Not appropriate with detox needs.   [facility] - Deflected d/t crutches  [facility] - Deflected d/t acuity + 1:1 needs, may retry 9/16 after 9am  [facility] - Deflected d/t acuity + 1:1 needs, may retry 9/16 after 9am  [facility] - Out of scope of care  [facility] - No beds, may retry 9/16 in the morning  [facility]- No answer - voicemail 2:40pm  [facility] - No beds   [facility] - Deflected d/t acuity + 1:1 needs, may retry 9/16 after 9am  [facility] - Deflected d/t crutches  [facility] - No answer - vm left 3pm  [facility] - No answer  vm left 3:02pm  [facility] - Out of scope of care  [facility] - Cannot accommodate crutches d/t staffing  [place] - requesting call back after 5pm  [facility] - Out of scope of care  [facility] - Cannot accommodate d/t 1:1 needs  [facility] - Out of scope of care  [facility] - Cannot accommodate crutches  [place] - Out of scope of care  [place] - Out of scope of care  [place] - Out of age range  [place] - Out of age range  [facility] - Out of scope of care  [place] - No beds  [facility] - Out of scope of care  [place] - deflected d/t acuity  [facility] - No beds, Needs 1:1  [facility] - No answer - vm left @ 322PM  [facility] - Cannot accept pt.   [facility] - Not accepting referrals  [facility] - No weekend referrals, may call Monday.  [facility] - deflected d/t acuity.  [facility] - deflected d/t acuity   [facility] - Does not offer detox/ Out of scope of care  [facility] - No beds can call again Tuesday    [facility] - Out of scope of care  [facility] - Out of scope of care

## 2024-09-15 NOTE — ED QUICK NOTES
Rounding Completed    Plan of Care reviewed. Waiting for placement.   Elimination needs assessed.      Bed is locked and in lowest position. Mother at bedside. Food offered, pt not hungry. Sts she is still having pain in leg, will inform MD.

## 2024-09-15 NOTE — ED PROVIDER NOTES
Patient Seen in: [facility] Emergency Department      History     Chief Complaint   Patient presents with    Eval-P    Leg or Foot Injury    Eval-D     Stated Complaint: right leg surgery, has had two falls on crutches, pain worse    Subjective:   HPI    Patient with history of acute alcohol use disorder, tib-fib fracture, anxiety, bipolar, here requesting detox from alcohol and help with leg pain.  She has been on a binge, has fallen multiple times, still on crutches, feels she is reinjured the leg.  Was seen at [facility], transferred here.  Patient says she often has withdrawal seizures, often has episodes she describes as walking around not knowing where she is, occurred during withdrawal.  Says she does not feel safe.  Has had some suicidal thoughts as well.  Prior attempts.    Objective:   Past Medical History:    Anxiety    Bipolar affective (HCC)    Depression              Past Surgical History:   Procedure Laterality Date    Fracture surgery                  Social History     Socioeconomic History    Marital status: Single   Tobacco Use    Smoking status: Never    Smokeless tobacco: Never   Vaping Use    Vaping status: Never Used   Substance and Sexual Activity    Alcohol use: Yes    Drug use: Not Currently     Frequency: 5.0 times per week     Types: benzodiazepines     Comment: Using Xanax 3-4 times a week, 2-6mg     Social Determinants of Health     Financial Resource Strain: Low Risk  (8/5/2024)    Received from [facility]    Overall Financial Resource Strain (CARDIA)     Difficulty of Paying Living Expenses: Not hard at all   Food Insecurity: No Food Insecurity (8/5/2024)    Received from [facility]    Hunger Vital Sign     Worried About Running Out of Food in the Last Year: Never true     Ran Out of Food in the Last Year: Never true   Transportation Needs: Unmet Transportation Needs (8/5/2024)    Received from [facility]     PRAPARE - Transportation     Lack of Transportation (Medical): No     Lack of Transportation (Non-Medical): Yes   Housing Stability: Low Risk  (8/5/2024)    Received from [facility]    Housing Stability Vital Sign     Unable to Pay for Housing in the Last Year: No     Number of Places Lived in the Last Year: 1     Unstable Housing in the Last Year: No              Review of Systems    Positive for stated Chief Complaint: Eval-P, Leg or Foot Injury, and Eval-D    Other systems are as noted in HPI.  Constitutional and vital signs reviewed.      All other systems reviewed and negative except as noted above.    Physical Exam     ED Triage Vitals [09/15/24 0813]   BP (!) 136/98   Pulse 104   Resp 16   Temp 98 °F (36.7 °C)   Temp src    SpO2 98 %   O2 Device None (Room air)       Current Vitals:   Vital Signs  BP: 119/85  Pulse: 78  Resp: 16  Temp: 98 °F (36.7 °C)  MAP (mmHg): 96    Oxygen Therapy  SpO2: 100 %  O2 Device: None (Room air)            Physical Exam    General: Well-developed, well-nourished, comfortable, no acute distress  Head and neck: Normocephalic, atraumatic  Cardiovascular: Regular rate and rhythm, normal S1 and S2, no obvious murmurs, rubs, or gallops   Lungs: Clear to auscultation bilaterally with equal breath sounds, no wheezing, no rhonchi   Abdomen: Positive bowel sounds,  soft, no tenderness, no distension, no rebound, no guarding   Extremities: No cyanosis, no clubbing, no edema   Musculoskeletal: Tenderness throughout the right tibia especially proximal half, proximal fibula  Skin: Scattered ecchymoses lower extremities  Neuro: Grossly intact to patient's baseline, distal motor and sensory intact  Lower extremity distal pulses intact, cap refill intact    ED Course     Labs Reviewed   COMP METABOLIC PANEL (14) - Abnormal; Notable for the following components:       Result Value    Sodium 134 (*)     Alkaline Phosphatase 107 (*)     A/G Ratio 0.9 (*)     All other  components within normal limits   CBC WITH DIFFERENTIAL WITH PLATELET - Abnormal; Notable for the following components:    HGB 11.7 (*)     Neutrophil Absolute Prelim 8.93 (*)     Neutrophil Absolute 8.93 (*)     Lymphocyte Absolute 0.97 (*)     All other components within normal limits   ETHYL ALCOHOL - Abnormal; Notable for the following components:    Ethyl Alcohol 108 (*)     All other components within normal limits   DRUG SCREEN 8 W/OUT CONFIRMATION, URINE - Abnormal; Notable for the following components:    Benzodiazepines Urine Presumed Positive (*)     Cocaine Urine Presumed Positive (*)     All other components within normal limits    Narrative:     Results of the Urine Drug Screen should be used only for medical purposes.   ACETAMINOPHEN (TYLENOL), S - Abnormal; Notable for the following components:    Acetaminophen 6.5 (*)     All other components within normal limits   SALICYLATE, SERUM - Abnormal; Notable for the following components:    Salicylate <1.7 (*)     All other components within normal limits   URINALYSIS, ROUTINE - Abnormal; Notable for the following components:    Clarity Urine Slightly Cloudy (*)     Blood Urine Small (*)     Protein Urine >=300 (*)     Nitrite Urine Positive (*)     All other components within normal limits   UA MICROSCOPIC ONLY, URINE - Abnormal; Notable for the following components:    WBC Urine 21-50 (*)     RBC Urine 3-5 (*)     Bacteria Urine 2+ (*)     Squamous Epi. Cells Moderate (*)     Renal Tubular Epithelial Cells Few (*)     Hyaline Casts Present (*)     Granular Casts Present (*)     All other components within normal limits   MAGNESIUM - Normal   LIPASE - Normal   ICTOTEST - Normal   POCT PREGNANCY URINE - Normal   SARS-COV-2 BY PCR (GENEXPERT) - Normal          Differential diagnosis includes alcohol withdrawal, suicidal ideation, UTI, among other processes                 MDM      21-year-old, history of bipolar, anxiety, alcohol use disorder, status post  tib-fib fracture recently, multiple falls more recently during alcohol binges.  Patient was evaluated by [facility].  She is not a candidate for inpatient behavioral health admission due to prior seizures, blackouts associated with withdrawal.  Also problematic admission for suicidal ideation given that she has crutches.  After discussion with [facility] staff, our ED staff, Dr. Eaton, decision made to admit to a medical bed, clear completely from alcohol withdrawal side and then reassess the behavioral health portion.                               MDM    Disposition and Plan     Clinical Impression:  1. Alcohol withdrawal syndrome without complication (HCC)    2. Suicidal ideation    3. Acute cystitis with hematuria         Disposition:  Psychiatric transfer  9/15/2024 11:18 am    Follow-up:  No follow-up provider specified.        Medications Prescribed:  There are no discharge medications for this patient.

## 2024-09-15 NOTE — ED QUICK NOTES
After speaking to MD and [facility] for crisis eval. It has been determined that the patient is going to be admitted inpatient to mental health facility for both detox and SI. Pt was determined to be low risk at initial assessment, but is now being upgraded to high risk. SI kanwal risk protocols started. Pt is calm and cooperative. Mother also at bedside.

## 2024-09-15 NOTE — H&P
[facility]     Lakia Aden Patient Status:  Emergency    8/10/1995 MRN [id number]   Location [facility] EMERGENCY DEPARTMENT Attending Lyndsay Palacios MD   Hosp Day # 0 PCP Tim Carroll MD     Chief Complaint: etoh detox    Subjective:    History of Present Illness:     Lakia Aden is a 29 year old female with past medical history significant for etoh abuse, recent tib/fib fracture, anxiety, bipolar d/o who presented to the ER requesting detox.  She has recently been on binge drinking a 1/5 of vodka every day.  She has also fallen multiple times and fractured her right leg and had surgery about one month ago and is still on crutches.  She states her pain is well controlled.  She also has a history of withdrawal seizure, last one being one year ago.  She also c/o suicidal thoughts but denies a plan.  Initially she was seen in [place] ED and was transferred to [place] [facility] and evaluated by [facility] but was deemed unsafe to admit to [facility] due to her crutches.  She admits to previous suicide attempt.  She denies a plan at this time but does admit to thought of self harm.    History/Other:    Past Medical History:  Past Medical History:    Anxiety    Bipolar affective (HCC)    Depression     Past Surgical History:   Past Surgical History:   Procedure Laterality Date    Fracture surgery        Family History:   No family history on file.  Social History:    reports that she has never smoked. She has never used smokeless tobacco. She reports current alcohol use. She reports that she does not currently use drugs after having used the following drugs: benzodiazepines. Frequency: 5.00 times per week.     Allergies:   Allergies   Allergen Reactions    Metoclopramide OTHER (SEE COMMENTS)     Pt states face becomes \"numb\" and unable to move hands    Sensitivity to reglan makes hands and mouth contract       Medications:    No current facility-administered medications on file prior to encounter.      No current outpatient medications on file prior to encounter.       Review of Systems:   A comprehensive review of systems was completed.    Pertinent positives and negatives noted in the HPI.    Objective:   Physical Exam:    /89   Pulse 80   Temp 98 °F (36.7 °C)   Resp 20   Ht 5' 7\" (1.702 m)   Wt 145 lb (65.8 kg)   LMP 08/18/2024 (Approximate)   SpO2 99%   BMI 22.71 kg/m²   General: No acute distress, Alert  Respiratory: No rhonchi, no wheezes  Cardiovascular: S1, S2. Regular rate and rhythm  Abdomen: Soft, Non-tender, non-distended, positive bowel sounds  Neuro: No new focal deficits  Extremities: No edema      Results:    Labs:      Labs Last 24 Hours:    Recent Labs   Lab 09/15/24  0826   RBC 3.87   HGB 11.7*   HCT 35.0   MCV 90.4   MCH 30.2   MCHC 33.4   RDW 13.3   NEPRELIM 8.93*   WBC 10.8   .0       Recent Labs   Lab 09/15/24  0826   GLU 98   BUN 11   CREATSERUM 0.80   EGFRCR 103   CA 9.2   ALB 3.7   *   K 3.5      CO2 26.0   ALKPHO 107*   AST 20   ALT 20   BILT 0.9   TP 7.8       No results found for: \"PT\", \"INR\"    No results for input(s): \"TROP\", \"TROPHS\", \"CK\" in the last 168 hours.    No results for input(s): \"TROP\", \"PBNP\" in the last 168 hours.    No results for input(s): \"PCT\" in the last 168 hours.    Imaging: Imaging data reviewed in Epic.    Assessment & Plan:      #Etoh abuse/withdrawal  Admit to med/behavioral floor  CIWA protocol  IVF, thiamine, folic acid    #Right tib/fib fracture    #Suicidal ideation  Suicide precautions  1:1 sitter  Psychiatry to eval    #Depression/anxiety/bipolar  Not on medication at this time  Per psychiatry    #Hyponatremia, mild          Plan of care discussed with pt and RN.    Robert Eaton MD    Supplementary Documentation:     The 21st Century Cures Act makes medical notes like these available to patients in the interest of transparency. Please be advised this is a medical document. Medical documents are intended to carry  relevant information, facts as evident, and the clinical opinion of the practitioner. The medical note is intended as peer to peer communication and may appear blunt or direct. It is written in medical language and may contain abbreviations or verbiage that are unfamiliar.

## 2024-09-15 NOTE — ED NOTES
Writer met with patient at bedside and explained voluntary admission. Patient is agreeable and completed paperwork. PT's voluntary and rights scanned into patient chart.

## 2024-09-15 NOTE — BH LEVEL OF CARE ASSESSMENT
Crisis Evaluation Assessment    Lakia Aden YOB: 1995   Age 29 year old MRN [id number]   Location [place] EMERGENCY DEPARTMENT IN [place] Attending Brittany Jerome MD      Patient's legal sex: female  Patient identifies as: female  Patient's birth sex: female  Preferred pronouns: She/her    Date of Service: 9/15/2024    Referral Source:  Referral Source  Where was crisis eval performed?: Remote  Referral Source: Self-Referral/Former Patient/Returning Patient    Reason for Crisis Evaluation   PT stated that she has a broken leg and has been falling recently. PT stated that she is feeling nauseous and shaking. PT stated that she wants detox from alcohol.            Collateral  PT denied          Suicide Crisis Syndrome:  Suicide Crisis Syndrome  Do you feel trapped with no good options left?: Yes  Are you overwhelmed, or have you lost control by negative thoughts filling your head? : Yes    Suicide Risk Screening:  Source of information for CSSR: Patient  In what setting is the screener performed?: in person  1. Have you wished you were dead or wished you could go to sleep and not wake up? (past 30 days): Yes (PT stated that she had this thought this morning)  2. Have you actually had any thoughts of killing yourself? (past 30 days): No              6. Have you ever done anything, started to do anything, or prepared to do anything to end your life? (lifetime): No  7. How long ago did you do any of these?: Over a year ago  Score -  OV: 1- Low Risk     Suicide Risk Assessments:  Suicidal Thoughts, Plan and Intent (this information to be used in conjunction with CSSR-S Suicide Screening)  Describe thoughts, ideation and intent:: PT stated that she has the thought of not wanting to wake up a few times over the last month.  Frequency: How many times have you had these thoughts?: Once a week  Duration: When you have the thoughts, how long do they last?: More than 8 hours/ persistent or  continuous  Controllability: Could/can you stop thinking about killing yourself or wanting to die if you want to?: Can control thoughts with some difficulty  Identify Risk Factors  Do you have access to lethal methods to attempt suicide?: No  Clinical Status:: Hopelessness;Substance abuse or dependence  Activating Events/Recent Stressors:: Acute Intoxication/Detox  Identify Protective Factors  Internal: Fear of death or dying due to pain and suffering;Identifies reasons for living  External: Supportive social network of family or friends;Responsibility to family or others, living with family  Risk Stratification  Risk Level: Low       PT stated that she does not feel safe going home due to the history of withdrawal AVH and seizures.            Non-Suicidal Self-Injury:   PT stated that she has a history of cutting. PT stated that she has not cut herself for over a year.          Risk to Others  Aggression: PT denied HI, aggression, violence or property destruction.    Psychosis: PT reports general paranoia and luc. PT stated that last week she was up for five straight days but fell asleep on her own. PT denied AVH, delusions, or psychosis.          Access to Means:  Access to Means  Has access to means to attempt suicide, self-injure, harm others, or damage property?: No  Access to Firearm/Weapon: No  Do you have a firearm owner identification (FOID) card?: No    Protective Factors:        Review of Psychiatric Systems:  Depression: PT reports sadness, helplessness, hopelessness and worthlessness. PT stated that it has been coming and going. PT stated that this time it has been a whole month. PT reports a loss of motivation and interest to do things. PT stated that she has been having difficulty completing ADLs.    Anxiety: PT reports anxiousness and nervousness. PT reports panic attacks with the most recent being this morning.     Psychosis: PT reports general paranoia and luc. PT stated that last week she was  up for five straight days but fell asleep on her own. PT denied AVH, delusions, or psychosis.    Sleep: PT has been up since yesterday morning. PT stated that she is afraid to go to sleep because she does not want to die in her sleep or wake up with seizures from ETOH withdrawals.     Appetite: PT reports she has not eaten in 2-3 days due to the nausea from EOTH use and withdrawals.          Substance Use:  Alcohol: PT stated that she last drank last night. PT stated that she has been binge drinking the last five days. PT has been drinking a bottle a day. PT stated that she has a history of AVH, seizures, nausea, vomiting, headaches, tremors, and heart issues.    Xanax: PT has been using 4mg a day. PT stated that she is not prescribed this. PT stated that she last used a few days ago.    Hydrocodone: PT stated that she has been prescribed this for her broken leg bust stated that she has not been taking it.                                                                                          Withdrawal Symptoms  History of Withdrawal Symptoms: Headaches;Nausea;Tremors;Vomiting;Tachycardia;Anxiety;Sweating;Visual hallucinations;Auditory hallucinations;Seizures  Last Withdrawal Episode: Current  Current Withdrawal Symptoms: Yes  ETOH/Benzo Symptoms: Headache  Shared Symptoms: Altered sleep patterns (comment);Sweating;Tachycardia;Anxiety;Vomiting;Nausea         Functional Achievement:   Work: PT denied    School: PT stated that she has not been completing her work. 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    Home: PT stated that she has been having difficulty completing ADLs.          Ability to Care for Self::   Home: PT stated that she has been having difficulty completing ADLs.          Current Treatment and Treatment History:  Dx: PT reports previous diagnoses of Depression and Anxiety    Therapy: PT denied    Psychiatry: PT denied    Medications: PT denied     IP/PHP/IOP: PT reports multiple  inpatient admissions with the most recent being a year ago. PT has completed PHP or IOP.          School/Work Performance:  Work: PT denied    School: PT stated that she has not been completing her work. 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          Relevant Social History:  Living Status: PT lives with her mother and dog.    Support System: PT identified her family    Legal: PT denied          Rafi and Complex (as applicable):                                    Current Medical (as applicable):  Current Medical  Medical Problems Under Current Treatment that will need to be continued after psychiatric admission: PT has a broken right leg. PT stated that it is a tib/fib fracture that was surgerical repaired on 08/08/24 with plates and screws. PT stated that she walks with crutches.  Do you have a Primary Care Physician?: Yes  Primary Care Physician Name: Dr. Carroll  Does the Patient Have: None  Active Eating Disorder: No    EDP Assessment (as applicable):  IBW Calculations  Weight: 145 lb  BMI (Calculated): 22.7  IBW LBS [place]: 135 LBS  IBW %: 107.41 %  IBW + 10%: 148.5 LBS  IBW - 10%: 121.5 LBS  SCOFF Questionnaire  Do you make yourself Sick because you feel uncomfortably full?: No  Do you worry that you have lost Control over how much you eat?: No  Have you recently lost more than One stone (14 lb) in a 3-month period?: No  Do you believe yourself to be Fat when others say you are too thin?: No  Would you say that Food dominates your life?: No  SCOFF Score: 0                                                                 Abuse Assessment:  Abuse Assessment  Physical Abuse: Denies  Verbal Abuse: Denies  Sexual Abuse: Denies  Neglect: Denies  Does anyone say or do something to you that makes you feel unsafe?: No  Have You Ever Been Harmed by a Partner/Caregiver?: No  Health Concerns r/t Abuse: No  Possible Abuse Reportable to:: Not appropriate for reporting to authorities    Mental Status Exam:    General Appearance  Characteristics: Appropriate clothing;Disheveled  Eye Contact: Direct  Psychomotor Behavior  Gait/Movement: Normal  Abnormal movements: None  Posture: Relaxed  Rate of Movement: Normal  Mood and Affect  Mood or Feelings: Sadness;Anxious  Anxiety Level- FELECIA only: Severe  Appropriateness of Affect: Congruent to mood;Appropriate to situation  Range of Affect: Normal  Stability of Affect: Stable  Attitude toward staff: Co-operative  Speech  Rate of Speech: Appropriate  Flow of Speech: Appropriate  Intensity of Volume: Ordinary  Clarity: Clear  Cognition  Concentration: Unimpaired  Memory: Recent memory intact;Remote memory intact  Orientation Level: Oriented X4;Oriented to person;Oriented to place;Oriented to time;Oriented to situation  Insight: Poor  Judgment: Poor  Thought Patterns  Clarity/Relevance: Coherent;Logical;Relevant to topic  Flow: Organized  Content: Ordinary  Level of Consciousness: Alert  Level of Consciousness: Alert  Behavior  Exhibited behavior: Appropriate to situation      Disposition:    Rationale for Treatment Recommendation:   PT is a 29 year old female who presented to the ED for ETOH detox and passive SI. PT stated that she has a broken leg and has been falling recently. PT stated that she is feeling nauseous and shaking. PT stated that she wants detox from alcohol. Alcohol: PT stated that she last drank last night. PT stated that she has been binge drinking the last five days. PT has been drinking a bottle a day. PT stated that she has a history of AVH, seizures, nausea, vomiting, headaches, tremors, and heart issues. Xanax: PT has been using 4mg a day. PT stated that she is not prescribed this. PT stated that she last used a few days ago. Hydrocodone: PT stated that she has been prescribed this for her broken leg bust stated that she has not been taking it. PT stated that she does not feel safe going home due to the history of withdrawal AVH and seizures. PT stated that  she has a history of cutting. PT stated that she has not cut herself for over a year. PT has been up since yesterday morning. PT stated that she is afraid to go to sleep because she does not want to die in her sleep or wake up with seizures from ETOH withdrawals. PT reports she has not eaten in 2-3 days due to the nausea from EOTH use and withdrawals. PT reports sadness, helplessness, hopelessness and worthlessness. PT stated that it has been coming and going. PT stated that this time it has been a whole month. PT reports a loss of motivation and interest to do things. PT stated that she has been having difficulty completing ADLs. PT reports anxiousness and nervousness. PT reports panic attacks with the most recent being this morning. PT reports general paranoia and luc. PT stated that last week she was up for five straight days but fell asleep on her own. PT denied SI, HI, AVH, SIB, aggression, violence, property destruction, delusions, or psychosis.    C-SSRS Score - 1  Suicide Assessment - Low risk               Level of Care Recommendations  Consulted with: Dr. Jerome  Level of Care Recommendation: Inpatient Acute Care  Unit: A1  Reason for Unit Assigned: Age/Sx  Inpatient Criteria: 24 hr behavior monitoring;Medical detox;Severely decreased function  Behavioral Precautions: Close Observation  Medical Precautions: Fall  Refused Treatment: No         Diagnoses with F-Codes:  Primary Psychiatric Diagnosis  F33.2 Major Depressive Disorder, Recurrent, Moderate, without psychotic features     Secondary Psychiatric Diagnoses  F10.13 Alcohol Use with withdrawal   Pervasive Diagnoses (as applicable)  Deferred   Pertinent Non-Psychiatric Diagnoses  Deferred          GUSTAVO Jackson

## 2024-09-15 NOTE — ED QUICK NOTES
Spoke to crisis worker at [facility]. Will call back to complete ipad assessment.

## 2024-09-15 NOTE — ED INITIAL ASSESSMENT (HPI)
Right lower leg pain/injury. Had surgery 1 month ago for tib/fib fracture. Has had two falls. Pt sts \"I have been on a binge for the last week\". Drinking a 5th of vodka a day. Sts she wants detox. Sts she has had SI with no plan. Last drink 8 hours ago.

## 2024-09-16 PROBLEM — F32.A DEPRESSIVE DISORDER: Status: ACTIVE | Noted: 2024-09-16

## 2024-09-16 PROBLEM — F41.9 ANXIETY DISORDER: Status: ACTIVE | Noted: 2024-09-16

## 2024-09-16 PROCEDURE — 90792 PSYCH DIAG EVAL W/MED SRVCS: CPT

## 2024-09-16 PROCEDURE — 99232 SBSQ HOSP IP/OBS MODERATE 35: CPT | Performed by: HOSPITALIST

## 2024-09-16 RX ORDER — CEPHALEXIN 500 MG/1
500 CAPSULE ORAL 3 TIMES DAILY
Qty: 9 CAPSULE | Refills: 0 | Status: SHIPPED | OUTPATIENT
Start: 2024-09-16 | End: 2024-09-18

## 2024-09-16 RX ORDER — MAGNESIUM OXIDE 400 MG/1
400 TABLET ORAL ONCE
Status: COMPLETED | OUTPATIENT
Start: 2024-09-16 | End: 2024-09-16

## 2024-09-16 RX ORDER — CEPHALEXIN 500 MG/1
500 CAPSULE ORAL 3 TIMES DAILY
Status: DISCONTINUED | OUTPATIENT
Start: 2024-09-16 | End: 2024-09-20

## 2024-09-16 NOTE — PLAN OF CARE
Pt alert and oriented x 4   RA  Tele-NSR  Reg/Gen Diet  PIV Left FA 0.9 NS at 83ml/hr  Tylenol for pian  Pt up standby with crutches    Problem: Patient/Family Goals  Goal: Patient/Family Long Term Goal  Description: Patient's Long Term Goal: Discharge    Interventions:  - Follow plan of care  - See additional Care Plan goals for specific interventions  Outcome: Progressing  Goal: Patient/Family Short Term Goal  Description: Patient's Short Term Goal: pain control    Interventions:   - PRN Tylenol  - See additional Care Plan goals for specific interventions  Outcome: Progressing

## 2024-09-16 NOTE — PLAN OF CARE
NURSING ADMISSION NOTE      Patient admitted via Cart  Oriented to room.  Safety precautions initiated.  Bed in low position.  Call light in reach.    Assumed care of 28 y/o female @2130  A/Ox4, CIWA protocol  Seizure precautions  MARY ANN COTOR-Tele  Non-cardiac EP  Regular diet, I/O's  Up SBA w/crutches  LFA PIV-infusing 0.9 @83 ml/hr  Safety measures maintained and all needs met

## 2024-09-16 NOTE — CONSULTS
[facility]  Report of Psychiatric Consultation    Lakia Aden Patient Status:  Inpatient    8/10/1995 MRN [id number]   Location [facility] 3NE-A Attending Avery Vasquez MD   Hosp Day # 1 PCP Tim Carroll MD     Date of Admission: 9/15/2024  Date of Consult: 9/15/2024  Reason for Consultation: SI/ETOH    Impression:    Primary Psychiatric Diagnosis:  Suicidal ideation, passive. No plan or intent    Depressive disorder, unspecified    Anxiety disorder, unspecified    Alcohol use disorder, severe    Alcohol withdrawal syndrome, without complications at this time    Rule Out Diagnoses:  MDD  Bipolar  SHARLENE  Substance induced depression/anxiety    Personality Traits:  Deferred     Pertinent Medical Diagnoses:  Right tib/fib fracture, hyponatremia, UTI    Recommendations:  Patient would benefit from inpatient dual psychiatric treatment. She currently has passive SI but concerned that if she was to return home that she is unsure she would keep self safe, especially if she was to relapse. She is waning treatment at this time and has signed for voluntarily.  No need for 1:1 sitter at this time. She is able to contract safety while in hospital.   Continue CIWA protocol with seizure precautions.  Transfer to inpatient psychiatric hospital once medically cleared and placement has been established.     LINDSAY Pulliam NP-C    History of Present Illness:  Patient presented to [facility] on 9/15/2024 do to noted reported binge on ETOH for the \"last week\". She reported that she was drinking 5th of vodka daily. She reported that she was interested in detox and also had SI with no plan. Patient had a  level of care assessment completed at [facility] and it was determined that patient was in need of inpatient psychiatric hospitalizations. Discharge planner attempted to find patient placement but was unable to established due to \"acuity\" or \"being out of scope\". She was admitted the med/psych for further  monitoring of detox symptoms.    Patient reports that currently having some passive SI. No plan or intent. She is able to contract for safety at this moment but has concerns that if she was to discharge home that she could develop SI with plan if withdrawal symptoms worsen OR if she was to relapse on ETOH. She reports that she has been experiencing depressive symptoms of feeling hopeless, helpless, and worthless. Having issues with concentration and motivation. Reports appetite fluctuates. Reports insomnia occurs and had not been able to sleep for the last 2 days prior to admission. Reports insomnia is mostly related to ruminations. Reports that she is \"a worrier\". Ruminations are mostly about her future. Reports racing thoughts. Denies daily panic attacks. Reports symptoms of chest tightness and cannot breathe. Denies current manic symptoms. Denies current psychosis symptoms. Does have history of hallucinations and paranoia when going through ETOH withdrawal.    Discussed dispositions options. Patient at this time is verbalizing that she is interested in Dual Inpatient Psychiatric Hospitalization. Will as [facility]/Discharge planner to work on assisting with inpatient psychiatric placement once medically cleared.     Past Psychiatric History:  Reports previous inpatient psychiatric hospitalization, last one noted was at [facility] in January 2024. Reports attended [facility] through [facility]. No current outpatient psychiatrist or therapist. Denies current psychiatric medications. Reports history of attempt by overdose. Reports previous SIB by cutting, last about a year ago.    Substance Use History:  ETOH - reports misuse since around the age of 22. She reports that she will binge drink. Periods of abstinence is from a few days to the longest being a month. Reports headache, nausea, insomnia, and tremors as withdrawal symptoms. Denies history of DTs and withdrawal seizures at this time but appears she previously had reported these.  Will drink about 1/5 of hard liquor.  on arrival to ER. Has been a previous residential/detox facilities.     Benzodiazepines - Xanax - she reports she is not prescribed this. Had been taking up to 10 mg daily. Reports daily use is down to about 2 mg daily. Last use was a few days ago.    Denies any other substance use.     UDS was + for Benzodiazepines and Cocaine.     Psych Family History:  Denies.    Social and Developmental History:   Patient reports that she lives with mother, 2 brothers, and 1 sister. Reports that she is the middle child. He father previously passed away from lung cancer. She reports that she is on leave of absence right now from [employer]. Has been getting around with crutches. Is note driving. Denies legal issues.     Past Medical History:    Anxiety    Bipolar affective (HCC)    Depression     Past Surgical History:   Procedure Laterality Date    Fracture surgery       History reviewed. No pertinent family history.   reports that she has never smoked. She has never used smokeless tobacco. She reports current alcohol use. She reports that she does not currently use drugs after having used the following drugs: benzodiazepines. Frequency: 5.00 times per week.    Allergies:  Allergies   Allergen Reactions    Metoclopramide OTHER (SEE COMMENTS)     Pt states face becomes \"numb\" and unable to move hands    Sensitivity to reglan makes hands and mouth contract       Medications:  Current Facility-Administered Medications:     cephALEXin (Keflex) cap 500 mg, 500 mg, Oral, TID    sodium chloride 0.9% infusion, , Intravenous, Continuous    LORazepam (Ativan) tab 1 mg, 1 mg, Oral, Q1H PRN **OR** LORazepam (Ativan) tab 2 mg, 2 mg, Oral, Q1H PRN    thiamine (Vitamin B1) tab 100 mg, 100 mg, Oral, Daily    multivitamin with minerals (Thera M Plus) tab 1 tablet, 1 tablet, Oral, Daily    folic acid (Folvite) tab 1 mg, 1 mg, Oral, Daily    metoprolol tartrate (Lopressor) tab 25 mg, 25 mg, Oral, BID PRN     acetaminophen (Tylenol Extra Strength) tab 500 mg, 500 mg, Oral, Q4H PRN    melatonin tab 3 mg, 3 mg, Oral, Nightly PRN    glycerin-hypromellose- (Artificial Tears) 0.2-0.2-1 % ophthalmic solution 1 drop, 1 drop, Both Eyes, QID PRN    sodium chloride (Saline Mist) 0.65 % nasal solution 1 spray, 1 spray, Each Nare, Q3H PRN    enoxaparin (Lovenox) 40 MG/0.4ML SUBQ injection 40 mg, 40 mg, Subcutaneous, Daily    ondansetron (Zofran) 4 MG/2ML injection 4 mg, 4 mg, Intravenous, Q6H PRN    prochlorperazine (Compazine) 10 MG/2ML injection 5 mg, 5 mg, Intravenous, Q8H PRN    Review of Systems   Psychiatric/Behavioral:  Positive for depression, substance abuse and suicidal ideas. Negative for hallucinations and memory loss. The patient is nervous/anxious and has insomnia.    All other systems reviewed and are negative.    Psychiatry Review Systems: No voices or visions. No elevated mood, racing thoughts, decreased need for sleep, grandiose thoughts, increased participation in risky behaviors, or history of trauma.     Mental Status Exam:     Risk Assessment  Suicidal ideation: passive thoughts of death / no suicidal ideation  Homicidal ideation: None noted    Appearance: disheveled  Behavior: unremarkable  Attitude: cooperative and consistent  Gait: Not observed    Speech: normal rate, rhythm and volume    Mood: sad, depressed, anxious, guilty, and hurt  Affect: Restricted    Thought process: logical  Thought content: ruminations  Perceptions: no hallucinations  Associations: Intact    Orientation: Alert and oriented x 4  Attention and Concentration:   focused and attentive  Memory:  intact immediate, recent, remote  Language: Intact naming and repetition  Fund of Knowledge: Able to recite name of [place] president    Insight: Limited   Judgment: Limited     Objective    Vitals:    09/16/24 0600   BP: (!) 123/94   Pulse: 72   Resp:    Temp:      Patient Strengths/Assets:  Caring     Suicide Risk Assessments:  Overall level  of suicide risk is moderate to high     Risk Factors: history of attempt by overdose, substance use     Environmental Factors: no outpatient psychiatric providers    Protective Factors: family    Laboratory Data:  Lab Results   Component Value Date    WBC 10.8 09/15/2024    HGB 11.7 09/15/2024    HCT 35.0 09/15/2024    .0 09/15/2024    CREATSERUM 0.80 09/15/2024    BUN 11 09/15/2024     09/15/2024    K 3.5 09/15/2024     09/15/2024    CO2 26.0 09/15/2024    GLU 98 09/15/2024    CA 9.2 09/15/2024    ALB 3.7 09/15/2024    ALKPHO 107 09/15/2024    BILT 0.9 09/15/2024    TP 7.8 09/15/2024    AST 20 09/15/2024    ALT 20 09/15/2024    LIP 33 09/15/2024    MG 1.7 09/15/2024    ETOH 108 09/15/2024    PGLU 192 09/15/2024       STUDIES:    Daya MONTOYA NP-C

## 2024-09-16 NOTE — BH PROGRESS NOTE
Writer spoke with patient at bedside. Writer explained that patient is medically cleared for inpatient treatment and patient is agreeable. Writer spoke with patient about placement options. Writer explained [facility] and patient declined stating that [facility] are too far. Patient asked about [facility] or [facility]. [facility] explained [facility] because [facility] and patient stated understanding. Patient stated that she will go anywhere close to home.

## 2024-09-16 NOTE — PROGRESS NOTES
[facility]   part of [facility]     Hospitalist Progress Note     Lakia Aden Patient Status:  Inpatient    8/10/1995 MRN [id number]   Location [facility] 3NE-A Attending Avery Vasquez MD   Hosp Day # 1 PCP Tim Carroll MD     Subjective:   No suicidal thoughts but has a h/o suicidal attempt  Just wants tx for detox and can hope to go somewhere soon.     Objective:    Review of Systems:   A comprehensive review of systems was completed; pertinent positive and negatives stated in subjective.  Vital signs:  Temp:  [98 °F (36.7 °C)-100.9 °F (38.3 °C)] 99.5 °F (37.5 °C)  Pulse:  [] 72  Resp:  [11-22] 15  BP: (103-136)/(69-98) 123/94  SpO2:  [98 %-100 %] 99 %  Physical Exam:    General: No acute distress   Respiratory: no wheezes, no rhonchi  Cardiovascular: S1, S2, RRR  Abdomen: Soft, NT/ND, +BS  Extremities: no edema, RLE surgical incision looks good     Diagnostic Data:    Labs:  Recent Labs   Lab 09/15/24  0826   WBC 10.8   HGB 11.7*   MCV 90.4   .0     Recent Labs   Lab 09/15/24  0826   GLU 98   BUN 11   CREATSERUM 0.80   CA 9.2   ALB 3.7   *   K 3.5      CO2 26.0   ALKPHO 107*   AST 20   ALT 20   BILT 0.9   TP 7.8     Estimated Creatinine Clearance: 101.8 mL/min (based on SCr of 0.8 mg/dL).  No results for input(s): \"PTP\", \"INR\" in the last 168 hours.     Microbiology  No results found for this visit on 09/15/24.  Imaging: Reviewed in Epic.  Medications:    magnesium oxide  400 mg Oral Once    thiamine  100 mg Oral Daily    multivitamin with minerals  1 tablet Oral Daily    folic acid  1 mg Oral Daily    enoxaparin  40 mg Subcutaneous Daily       Assessment & Plan:    #Etoh abuse/withdrawal  -[facility] protocol  -IVF, thiamine, folic acid     #Right tib/fib fracture 1 month ago on crutches      #Suicidal ideation. H/o suicide attempt. She has no plan and denies any suicidal thoughts to me this AM   -Psychiatry to eval     #Depression/anxiety/bipolar  -Not on medication at this  time  -Per psychiatry     #Hyponatremia, mild    #Fever/UTI- abx          Supplementary Documentation:   Quality:  DVT Mechanical Prophylaxis:   SCDs,    DVT Pharmacologic Prophylaxis   Medication    enoxaparin (Lovenox) 40 MG/0.4ML SUBQ injection 40 mg                Code Status: Full Code  Em: No urinary catheter in place  Em Duration (in days):   Central line:    LESLIE:   At this point Ms. Aden is expected to be discharge to: d     The 21st Century Cures Act makes medical notes like these available to patients in the interest of transparency. Please be advised this is a medical document. Medical documents are intended to carry relevant information, facts as evident, and the clinical opinion of the practitioner. The medical note is intended as peer to peer communication and may appear blunt or direct. It is written in medical language and may contain abbreviations or verbiage that are unfamiliar.

## 2024-09-17 LAB — MAGNESIUM SERPL-MCNC: 1.9 MG/DL (ref 1.6–2.6)

## 2024-09-17 PROCEDURE — 99232 SBSQ HOSP IP/OBS MODERATE 35: CPT

## 2024-09-17 PROCEDURE — 99232 SBSQ HOSP IP/OBS MODERATE 35: CPT | Performed by: HOSPITALIST

## 2024-09-17 RX ORDER — QUETIAPINE FUMARATE 25 MG/1
50 TABLET, FILM COATED ORAL NIGHTLY PRN
Status: DISCONTINUED | OUTPATIENT
Start: 2024-09-17 | End: 2024-09-20

## 2024-09-17 RX ORDER — ONDANSETRON 4 MG/1
4 TABLET, FILM COATED ORAL EVERY 8 HOURS PRN
Status: DISCONTINUED | OUTPATIENT
Start: 2024-09-17 | End: 2024-09-20

## 2024-09-17 NOTE — PROGRESS NOTES
[facility]  Report of Psychiatric Progress Note    Lakia Aden Patient Status:  Inpatient    8/10/1995 MRN [id number]   Location [facility] 3NE-A Attending Avery Vasquez MD   Hosp Day # 2 PCP Tim Carroll MD     Date of Admission: 9/15/2024  Date of Service: 2024  Reason for Consultation: SI/ETOH    Impression:    Primary Psychiatric Diagnosis:  Suicidal ideation, passive. No plan or intent    Depressive disorder, unspecified    Anxiety disorder, unspecified    Alcohol use disorder, severe    Alcohol withdrawal syndrome, without complications at this time    Rule Out Diagnoses:  MDD  Bipolar  SHARLENE  Substance induced depression/anxiety    Personality Traits:  Deferred     Pertinent Medical Diagnoses:  Right tib/fib fracture, hyponatremia, UTI    Recommendations:  Patient would benefit from inpatient dual psychiatric treatment. She currently has passive SI but concerned that if she was to return home that she is unsure she would keep self safe, especially if she was to relapse. She is waning treatment at this time and has signed for voluntarily.  No need for 1:1 sitter at this time. She is able to contract safety while in hospital.   Continue CIWA protocol with seizure precautions.  Start on Seroquel 50 mg nightly as needed for sleep.   Transfer to inpatient psychiatric hospital once medically cleared and placement has been established.     LINDSAY Pulliam NP-C    History of Present Illness:  Patient presented to [facility] on 9/15/2024 do to noted reported binge on ETOH for the \"last week\". She reported that she was drinking 5th of vodka daily. She reported that she was interested in detox and also had SI with no plan. Patient had a  level of care assessment completed at [facility] and it was determined that patient was in need of inpatient psychiatric hospitalizations. Discharge planner attempted to find patient placement but was unable to established due to \"acuity\" or \"being  out of scope\". She was admitted the med/psych for further monitoring of detox symptoms.    Patient reports that currently having some passive SI. No plan or intent. She is able to contract for safety at this moment but has concerns that if she was to discharge home that she could develop SI with plan if withdrawal symptoms worsen OR if she was to relapse on ETOH. She reports that she has been experiencing depressive symptoms of feeling hopeless, helpless, and worthless. Having issues with concentration and motivation. Reports appetite fluctuates. Reports insomnia occurs and had not been able to sleep for the last 2 days prior to admission. Reports insomnia is mostly related to ruminations. Reports that she is \"a worrier\". Ruminations are mostly about her future. Reports racing thoughts. Denies daily panic attacks. Reports symptoms of chest tightness and cannot breathe. Denies current manic symptoms. Denies current psychosis symptoms. Does have history of hallucinations and paranoia when going through ETOH withdrawal.    Discussed dispositions options. Patient at this time is verbalizing that she is interested in Dual Inpatient Psychiatric Hospitalization. Will as [facility]/Discharge planner to work on assisting with inpatient psychiatric placement once medically cleared.     Interval History  9/17/2024 - Patient is noted to appear more comfortable this morning. Has needed two doses of Ativan today. She continues to express interest in Dual inpatient psychiatric treatment to treat ETOH use, depression, anxiety, and passive SI. She denies any current SI but continues to have fears that this will change if she discharges from the hospital. She reports that she had poor sleep last night. Previously on Trazodone and felt no benefit from it. Did report previously took Seroquel at night that aided her in sleep. Agreeable to start this PRN in the hospital.     Past Psychiatric History:  Reports previous inpatient psychiatric  hospitalization, last one noted was at [place] in January 2024. Reports attended PHP through [facility]. No current outpatient psychiatrist or therapist. Denies current psychiatric medications. Reports history of attempt by overdose. Reports previous SIB by cutting, last about a year ago.    Substance Use History:  ETOH - reports misuse since around the age of 22. She reports that she will binge drink. Periods of abstinence is from a few days to the longest being a month. Reports headache, nausea, insomnia, and tremors as withdrawal symptoms. Denies history of DTs and withdrawal seizures at this time but appears she previously had reported these. Will drink about 1/5 of hard liquor.  on arrival to ER. Has been a previous residential/detox facilities.     Benzodiazepines - Xanax - she reports she is not prescribed this. Had been taking up to 10 mg daily. Reports daily use is down to about 2 mg daily. Last use was a few days ago.    Denies any other substance use.     UDS was + for Benzodiazepines and Cocaine.     Psych Family History:  Denies.    Social and Developmental History:   Patient reports that she lives with mother, 2 brothers, and 1 sister. Reports that she is the middle child. He father previously passed away from lung cancer. She reports that she is on leave of absence right now from [employer]. Has been getting around with crutches. Is note driving. Denies legal issues.     Past Medical History:    Anxiety    Bipolar affective (HCC)    Depression     Past Surgical History:   Procedure Laterality Date    Fracture surgery       History reviewed. No pertinent family history.   reports that she has never smoked. She has never used smokeless tobacco. She reports current alcohol use. She reports that she does not currently use drugs after having used the following drugs: benzodiazepines. Frequency: 5.00 times per week.    Allergies:  Allergies   Allergen Reactions    Metoclopramide OTHER (SEE COMMENTS)     Pt  states face becomes \"numb\" and unable to move hands    Sensitivity to reglan makes hands and mouth contract       Medications:  Current Facility-Administered Medications:     cephALEXin (Keflex) cap 500 mg, 500 mg, Oral, TID    sodium chloride 0.9% infusion, , Intravenous, Continuous    LORazepam (Ativan) tab 1 mg, 1 mg, Oral, Q1H PRN **OR** LORazepam (Ativan) tab 2 mg, 2 mg, Oral, Q1H PRN    thiamine (Vitamin B1) tab 100 mg, 100 mg, Oral, Daily    multivitamin with minerals (Thera M Plus) tab 1 tablet, 1 tablet, Oral, Daily    folic acid (Folvite) tab 1 mg, 1 mg, Oral, Daily    metoprolol tartrate (Lopressor) tab 25 mg, 25 mg, Oral, BID PRN    acetaminophen (Tylenol Extra Strength) tab 500 mg, 500 mg, Oral, Q4H PRN    melatonin tab 3 mg, 3 mg, Oral, Nightly PRN    glycerin-hypromellose- (Artificial Tears) 0.2-0.2-1 % ophthalmic solution 1 drop, 1 drop, Both Eyes, QID PRN    sodium chloride (Saline Mist) 0.65 % nasal solution 1 spray, 1 spray, Each Nare, Q3H PRN    enoxaparin (Lovenox) 40 MG/0.4ML SUBQ injection 40 mg, 40 mg, Subcutaneous, Daily    ondansetron (Zofran) 4 MG/2ML injection 4 mg, 4 mg, Intravenous, Q6H PRN    prochlorperazine (Compazine) 10 MG/2ML injection 5 mg, 5 mg, Intravenous, Q8H PRN    Review of Systems   Psychiatric/Behavioral:  Positive for depression, substance abuse and suicidal ideas. Negative for hallucinations and memory loss. The patient is nervous/anxious and has insomnia.    All other systems reviewed and are negative.    Psychiatry Review Systems: No voices or visions. No elevated mood, racing thoughts, decreased need for sleep, grandiose thoughts, increased participation in risky behaviors, or history of trauma.     Mental Status Exam:     Risk Assessment  Suicidal ideation: passive thoughts of death / no suicidal ideation  Homicidal ideation: None noted    Appearance: disheveled  Behavior: unremarkable  Attitude: cooperative and consistent  Gait: Not observed    Speech:  normal rate, rhythm and volume    Mood: sad, depressed, anxious, guilty, and hurt  Affect: Restricted    Thought process: logical  Thought content: ruminations  Perceptions: no hallucinations  Associations: Intact    Orientation: Alert and oriented x 4  Attention and Concentration:   focused and attentive  Memory:  intact immediate, recent, remote  Language: Intact naming and repetition  Fund of Knowledge: Able to recite name of [place] president    Insight: Limited   Judgment: Limited     Objective    Vitals:    09/17/24 0800   BP: 119/88   Pulse: 73   Resp: 16   Temp: 98.5 °F (36.9 °C)     Patient Strengths/Assets:  Caring     Suicide Risk Assessments:  Overall level of suicide risk is moderate to high     Risk Factors: history of attempt by overdose, substance use     Environmental Factors: no outpatient psychiatric providers    Protective Factors: family    Laboratory Data:         STUDIES:    Daya BLACKMONC

## 2024-09-17 NOTE — PROGRESS NOTES
[facility]   part of [facility]     Hospitalist Progress Note     Lakia Aden Patient Status:  Inpatient    8/10/1995 MRN [id number]   Location [facility] 3NE-A Attending Avery Vasquez MD   Hosp Day # 2 PCP Tim Carroll MD     Subjective:   Mild nausea  No other issues     Objective:    Review of Systems:   A comprehensive review of systems was completed; pertinent positive and negatives stated in subjective.  Vital signs:  Temp:  [97.6 °F (36.4 °C)-98.2 °F (36.8 °C)] 98.2 °F (36.8 °C)  Pulse:  [] 81  Resp:  [15-18] 16  BP: (114-131)/() 128/87  SpO2:  [96 %-99 %] 96 %  Physical Exam:    General: No acute distress   Respiratory: no wheezes, no rhonchi  Cardiovascular: S1, S2, RRR  Abdomen: Soft, NT/ND, +BS  Extremities: no edema, RLE surgical incision looks good     Diagnostic Data:    Labs:  Recent Labs   Lab 09/15/24  0826   WBC 10.8   HGB 11.7*   MCV 90.4   .0     Recent Labs   Lab 09/15/24  0826   GLU 98   BUN 11   CREATSERUM 0.80   CA 9.2   ALB 3.7   *   K 3.5      CO2 26.0   ALKPHO 107*   AST 20   ALT 20   BILT 0.9   TP 7.8     Estimated Creatinine Clearance: 101.8 mL/min (based on SCr of 0.8 mg/dL).  No results for input(s): \"PTP\", \"INR\" in the last 168 hours.     Microbiology  No results found for this visit on 09/15/24.  Imaging: Reviewed in Epic.  Medications:    cephalexin  500 mg Oral TID    thiamine  100 mg Oral Daily    multivitamin with minerals  1 tablet Oral Daily    folic acid  1 mg Oral Daily    enoxaparin  40 mg Subcutaneous Daily       Assessment & Plan:    #Etoh abuse/withdrawal  -[facility] protocol  -IVF, thiamine, folic acid     #Right tib/fib fracture 1 month ago on crutches      #Suicidal ideation. H/o suicide attempt. She has no plan and denies any suicidal thoughts to me this AM   -Psychiatry evaluation appreciated      #Depression/anxiety/bipolar  -Not on medication at this time  -Per psychiatry     #Hyponatremia, mild    #Fever/UTI- abx           Supplementary Documentation:   Quality:  DVT Mechanical Prophylaxis:   SCDs,    DVT Pharmacologic Prophylaxis   Medication    enoxaparin (Lovenox) 40 MG/0.4ML SUBQ injection 40 mg                Code Status: Full Code  Em: No urinary catheter in place  Em Duration (in days):   Central line:    LESLIE: 9/16/2024  At this point Ms. Aden is expected to be discharge to: tbd     The 21st Century Cures Act makes medical notes like these available to patients in the interest of transparency. Please be advised this is a medical document. Medical documents are intended to carry relevant information, facts as evident, and the clinical opinion of the practitioner. The medical note is intended as peer to peer communication and may appear blunt or direct. It is written in medical language and may contain abbreviations or verbiage that are unfamiliar.

## 2024-09-17 NOTE — BH PROGRESS NOTE
Writer began placement search with patients preferences.   [facility] - OON  [facility] - Cannot accommodate d/t medical acuity  [facility] - Unable to accommodate needs   [facility]- Unable to accommodate needs  [facility] - unable to accommodate with hard cast and history of seizures.    Writer obtained information of preference for pt. Prefers to stay closer to [place] if at all possible.     [facility] - cannot accommodate 1:1  [facility] - cannot accommodate 1:1 needs today  [facility] - cannot accommodate 1:1 needs today  [facility] - Out of scope of care.  [facility] - cannot accommodate 1:1  [facility] - cannot accommodate 1:1  [facility] - No beds  [facility] - No answer - 103pm  [facility] - Cannot accommodate 1:1 needs/leg  [facility] - Cannot accommodate 1:1  [facility] - No F beds  [facility] - cannot accommodate 1:1 needs.   [facility] - cannot accommodate dual    [facility] - Does not accept detox  [facility] - Cannot accept dual detox  [facility] - Out of scope   [facility] - Out of Scope  [facility] - Out of scope  [facility] - does not match population  [facility] - does not match population

## 2024-09-17 NOTE — PLAN OF CARE
Assumed care at 1930. A/Ox4, on room air, NSR on tele. Regular diet. Ambulates with SBA, voids. CIWA protocol. Seizure precautions in place. No MD CATA aware. Patient updated with plan of care. All needs met at this time.     Problem: Patient/Family Goals  Goal: Patient/Family Long Term Goal  Description: Patient's Long Term Goal: discharge    Interventions:  - Follow plan of care  - See additional Care Plan goals for specific interventions  Outcome: Progressing  Goal: Patient/Family Short Term Goal  Description: Patient's Short Term Goal: pain control    Interventions:   - PRN tylenol  - See additional Care Plan goals for specific interventions  Outcome: Progressing

## 2024-09-17 NOTE — PLAN OF CARE
Assumed patient care at 0730. Alert and oriented x4. Room air. NSR on tele. Heart rate in the 80s. O2 99%. Patient is able to ambulate and void on own. CIWA protocol. Seizure precautions in place. All needs for this patient are currently met. All safety precautions are in place and will continue with plan of care.

## 2024-09-17 NOTE — PAYOR COMM NOTE
--------------  ADMISSION REVIEW     Payor: DAVID  Subscriber #:  [id number]  Authorization Number: [id number]    Admit date: 9/15/24  Admit time:  9:09 PM       REVIEW DOCUMENTATION:    Patient Seen in: [facility] [place]      History     Chief Complaint   Patient presents with    Leg or Foot Injury    Eval-D     Stated Complaint: right leg surgery, has had two falls on crutches, pain worse    Subjective:   HPI    29-year-old with a history of anxiety, bipolar affective disorder, alcohol use disorder presents for evaluation of leg pain as well as requesting detox from alcohol.  Patient had a R tib/fib fracture and had surgery a month ago. Has fallen a few times on her crutches and feels she has re injured it. Has pain in her R shin and ankle.  Patient went on a binge and has been drinking a fifth of vodka daily for 5 days. Prior to that, was drinking on and off, tends to drink in a binge pattern. Reports 2-3 prior withdrawal seizures; last one was 1 year ago. Has had DTs in the past, as well. Feels shaky and has been vomiting. Feels dizzy. Has had some thoughts of suicide and reports prior attempts.  States she does not feel safe going home.  Has been to [facility] in [facility] in the past.   DAVIDE = 8 hours ago  CIWA = 12    Objective:   Past Medical History:    Anxiety    Bipolar affective (HCC)    Depression     Past Surgical History:   Procedure Laterality Date    Fracture surgery       Physical Exam     ED Triage Vitals [09/15/24 0813]   BP (!) 136/98   Pulse 104   Resp 16   Temp 98 °F (36.7 °C)   Temp src    SpO2 98 %   O2 Device None (Room air)       Current Vitals:   Vital Signs  BP: (!) 124/94  Pulse: 99  Resp: 16  Temp: 98 °F (36.7 °C)    Oxygen Therapy  SpO2: 98 %  O2 Device: None (Room air)    Physical Exam    General: Patient is awake, alert, actively vomiting  HEENT:  Sclera are not icteric.  Conjunctivae within normal limits.  Mucous members are mild to moderately dry.    Cardiovascular: Regular rate and rhythm, normal S1-S2.  Respiratory: Lungs are clear to auscultation bilaterally.   Abdomen: Soft, nontender, nondistended  Extremities: There is mild tenderness to the right anterior shin.  No deformity.  Mild tenderness to the right ankle without deformity.  No edema.  No unilateral calf swelling or calf tenderness to palpation.  Neuro: Cranial nerves II through XII are intact bilaterally.  Normal strength and sensation bilateral UE and LE.  Patient is alert and answers questions appropriately.  She has mild to moderately tremulous.  Psychiatric: No apparent hallucinations or delusions.  No pressured speech or flight of ideas.  Patient is not agitated.    ED Course     Labs Reviewed   COMP METABOLIC PANEL (14) - Abnormal; Notable for the following components:       Result Value    Sodium 134 (*)     Alkaline Phosphatase 107 (*)     A/G Ratio 0.9 (*)     All other components within normal limits   CBC WITH DIFFERENTIAL WITH PLATELET - Abnormal; Notable for the following components:    HGB 11.7 (*)     Neutrophil Absolute Prelim 8.93 (*)     Neutrophil Absolute 8.93 (*)     Lymphocyte Absolute 0.97 (*)     All other components within normal limits   ETHYL ALCOHOL - Abnormal; Notable for the following components:    Ethyl Alcohol 108 (*)     All other components within normal limits   DRUG SCREEN 8 W/OUT CONFIRMATION, URINE - Abnormal; Notable for the following components:    Benzodiazepines Urine Presumed Positive (*)     Cocaine Urine Presumed Positive (*)     All other components within normal limits    Narrative:     Results of the Urine Drug Screen should be used only for medical purposes.   ACETAMINOPHEN (TYLENOL), S - Abnormal; Notable for the following components:    Acetaminophen 6.5 (*)     All other components within normal limits   SALICYLATE, SERUM - Abnormal; Notable for the following components:    Salicylate <1.7 (*)     All other components within normal limits    URINALYSIS, ROUTINE - Abnormal; Notable for the following components:    Clarity Urine Slightly Cloudy (*)     Blood Urine Small (*)     Protein Urine >=300 (*)     Nitrite Urine Positive (*)     All other components within normal limits   UA MICROSCOPIC ONLY, URINE - Abnormal; Notable for the following components:    WBC Urine 21-50 (*)     RBC Urine 3-5 (*)     Bacteria Urine 2+ (*)     Squamous Epi. Cells Moderate (*)     Renal Tubular Epithelial Cells Few (*)     Hyaline Casts Present (*)     Granular Casts Present (*)     All other components within normal limits   MAGNESIUM - Normal   LIPASE - Normal   ICTOTEST - Normal   POCT PREGNANCY URINE - Normal   SARS-COV-2 BY PCR (GENEXPERT) - Normal     EKG    Rate, intervals and axes as noted on EKG Report.  Rate: 82  Rhythm: Sinus Rhythm  Reading: No ST elevation or depression.  No dysrhythmia.  Normal intervals including QTc 472    Right tib-fib: No evidence of hardware complication.  Persistent lucency of the fracture plane compatible with incomplete osseous bridging.  Nondisplaced fracture through the proximal fibular shaft with moderate callus formation, however with persistent lucency of the fracture plane compatible with incomplete osseous bridging  Right ankle: I personally reviewed the radiographs and my individual interpretation shows ankle mortise preserved.  I also reviewed the official report which showed partially visualized fracture of the distal tibial shaft.   Fluids, Zofran Ativan, Toradol ordered  Tylenol for pain  First dose of Bactrim given for suspected UTI and urinalysis    Disposition and Plan     Clinical Impression:  1. Alcohol withdrawal syndrome without complication (HCC)    2. Suicidal ideation    3. Acute cystitis with hematuria         Disposition:  Psychiatric transfer  9/15/2024 11:18 am      Signed by Brittany Jerome MD on 9/15/2024  2:56 PM     After discussion with [facility] staff, our ED staff, Dr. Eaton, decision made to  admit to a medical bed, clear completely from alcohol withdrawal side and then reassess the behavioral health portion.      Disposition and Plan     Clinical Impression:  1. Alcohol withdrawal syndrome without complication (HCC)    2. Suicidal ideation    3. Acute cystitis with hematuria           Signed by Lyndsay Palacios MD on 9/16/2024  3:32 AM       ketorolac (Toradol) 15 MG/ML injection 15 mg  Dose: 15 mg  Freq: Once Route: IV  Start: 09/15/24 0850 End: 09/15/24 0902           0902 BP-Given          LORazepam (Ativan) 2 mg/mL injection 2 mg  Dose: 2 mg  Freq: Once Route: IV  Start: 09/15/24 1116 End: 09/15/24 1118   Admin Instructions:   For IV use dilute 1:1 with saline   Order specific questions:              1118 BP-Given          LORazepam (Ativan) 2 mg/mL injection 2 mg  Dose: 2 mg  Freq: Once Route: IV  Start: 09/15/24 0850 End: 09/15/24 0902   Admin Instructions:   For IV use dilute 1:1 with saline           0902 BP-Given          thiamine 100 mg/mL injection 100 mg  Dose: 100 mg  Freq: Once Route: IV  Start: 09/15/24 2145 End: 09/15/24 2240     Medications 09/08 09/09 09/10 09/11 09/12 09/13 09/14 09/15 09/16 09/17   sodium chloride 0.9% infusion  Rate: 83 mL/hr  Freq: Continuous Route: IV  Start: 09/15/24 2145                  LORazepam (Ativan) tab 1 mg  Dose: 1 mg  Freq: Every 1 hour PRN Route: OR  PRN Comment: For [facility] 7-14  Start: 09/15/24 2141   Admin Instructions:   X 1 ON 9-15   ondansetron (Zofran) 4 MG/2ML injection 4 mg  Dose: 4 mg  Freq: Every 6 hours PRN Route: IV  PRN Reasons: Nausea,vomiting  Start: 09/15/24 2141 End: 09/17/24 1206  X1 ON  9-15     HISTORY AND PHYSICAL      Initially she was seen in [place] [facility] and was transferred to [facility] and evaluated by [facility] but was deemed unsafe to admit to [facility] due to her crutches.  She admits to previous suicide attempt.  She denies a plan at this time but does admit to thought of self harm.     Assessment & Plan:  #Etoh  abuse/withdrawal  Admit to med/behavioral floor  [facility] protocol  IVF, thiamine, folic acid     #Right tib/fib fracture     #Suicidal ideation  Suicide precautions  1:1 sitter  Psychiatry to je[facility]     #Depression/anxiety/bipolar  Not on medication at this time  Per psychiatry     #Hyponatremia, mild       REVIEW DOCUMENTATION  9-16-24 09/16/24 0200 -- 89 17 125/96 Abnormal  100 % -- None (Room air) -- AS   09/16/24 0000 99.5 °F (37.5 °C) 83 19 131/98 Abnormal  99 % -- None (Room air) -- AS   09/15/24 2248 -- -- -- -- -- 142 lb (64.4 kg) -- --    09/15/24 2200 -- -- -- 133/91 Abnormal  -- -- -- --    09/15/24 2119 100.9 °F (38.3 °C) Abnormal  -- 16 -- 98 % 142 lb 14.4 oz (64.8 kg) None (Room air) -- AS         No suicidal thoughts but has a h/o suicidal attempt  Just wants tx for detox and can hope to go somewhere soon.     Vital signs:  Temp:  [98 °F (36.7 °C)-100.9 °F (38.3 °C)] 99.5 °F (37.5 °C)  Pulse:  [] 72  Resp:  [11-22] 15  BP: (103-136)/(69-98) 123/94  SpO2:  [98 %-100 %] 99 %  Physical Exam:    General: No acute distress   Respiratory: no wheezes, no rhonchi  Cardiovascular: S1, S2, RRR  Abdomen: Soft, NT/ND, +BS  Extremities: no edema, RLE surgical incision looks good       Assessment & Plan:  #Etoh abuse/withdrawal  -[facility] protocol  -IVF, thiamine, folic acid     #Right tib/fib fracture 1 month ago on crutches      #Suicidal ideation. H/o suicide attempt. She has no plan and denies any suicidal thoughts to me this AM   -Psychiatry to [facility]     #Depression/anxiety/bipolar  -Not on medication at this time  -Per psychiatry     #Hyponatremia, mild     #Fever/UTI- abx     MEDICATIONS ADMINISTERED IN LAST 1 DAY:  acetaminophen (Tylenol Extra Strength) tab 500 mg       Date Action Dose Route User    9/16/2024 1433 Given 500 mg Oral Gall, Lakia          cephALEXin (Keflex) cap 500 mg       Date Action Dose Route User    9/17/2024 1019 Given 500 mg Oral Prachi Price RN    9/16/2024 2054 Given 500 mg  Oral Jennifer Bryant RN    9/16/2024 1619 Given 500 mg Oral Shauna Stevens RN          enoxaparin (Lovenox) 40 MG/0.4ML SUBQ injection 40 mg       Date Action Dose Route User    9/17/2024 1019 Given 40 mg Subcutaneous (Left Lower Abdomen) Prachi Price RN          folic acid (Folvite) tab 1 mg       Date Action Dose Route User    9/17/2024 1019 Given 1 mg Oral Prachi Price RN          LORazepam (Ativan) tab 1 mg       Date Action Dose Route User    9/17/2024 1015 Given 1 mg Oral Prachi Price RN    9/17/2024 0400 Given 1 mg Oral Jennifer Bryant RN    9/16/2024 1434 Given 1 mg Oral Gall, Lakia          melatonin tab 3 mg       Date Action Dose Route User    9/16/2024 2056 Given 3 mg Oral Jennifer Bryant RN          ondansetron (Zofran) 4 MG/2ML injection 4 mg       Date Action Dose Route User    9/16/2024 1236 Given 4 mg Intravenous Shauna Stevens RN          multivitamin with minerals (Thera M Plus) tab 1 tablet       Date Action Dose Route User    9/17/2024 1019 Given 1 tablet Oral Prachi Price RN          thiamine (Vitamin B1) tab 100 mg       Date Action Dose Route User    9/17/2024 1019 Given 100 mg Oral Prachi Price RN            Vitals (last day)       Date/Time Temp Pulse Resp BP SpO2 Weight O2 Device O2 Flow Rate (L/min) [facility]    09/17/24 0800 98.5 °F (36.9 °C) 73 16 119/88 97 % -- None (Room air) --     09/17/24 0600 -- 81 -- 128/87 -- -- -- --     09/17/24 0400 98.2 °F (36.8 °C) 88 16 114/86 96 % -- None (Room air) --     09/17/24 0200 -- 71 -- 121/95 -- -- -- --     09/17/24 0000 -- 79 16 120/92 99 % -- None (Room air) --     09/17/24 0000 98.1 °F (36.7 °C) -- -- -- -- -- -- -- AM    09/16/24 2200 -- 90 -- 119/94 -- -- -- --     09/16/24 2000 97.6 °F (36.4 °C) 81 18 131/113 99 % -- None (Room air) -- AM    09/16/24 1600 98.2 °F (36.8 °C) 78 18 125/88 98 % -- None (Room air) 0 L/min ER    09/16/24 1400 -- 93 -- -- -- -- -- -- LV    09/16/24 1230 -- 109 18 --  98 % -- -- -- ER    09/16/24 1200 97.8 °F (36.6 °C) 90 15 126/95 98 % -- None (Room air) 0 L/min ER    09/16/24 1000 -- 85 -- -- -- -- -- -- LV    09/16/24 0800 98.2 °F (36.8 °C) 74 16 116/103 98 % -- None (Room air) 0 L/min ER    09/16/24 0600 -- 72 -- 123/94 -- -- -- -- JA    09/16/24 0400 -- 71 15 123/89 99 % -- None (Room air) -- AP    09/16/24 0200 -- 89 17 125/96 100 % -- None (Room air) -- AS    09/16/24 0000 99.5 °F (37.5 °C) 83 19 131/98 99 % -- None (Room air) -- AS          CIWA Scores (since admission)       Date/Time CIWA-Ar Total Who    09/17/24 1000 9 ST    09/17/24 0600 3     09/17/24 0400 7     09/17/24 0357 7     09/17/24 0200 3     09/17/24 0000 4     09/16/24 2200 5     09/16/24 2000 4     09/16/24 1800 5     09/16/24 1600 5     09/16/24 1400 8     09/16/24 1200 5     09/16/24 1000 5     09/16/24 0800 9 AG    09/16/24 0600 4     09/16/24 0400 4     09/16/24 0200 5     09/16/24 0000 5     09/15/24 2200 9 JA    09/15/24 1800 9 BS    09/15/24 1630 11 BS    09/15/24 1507 6 BP    09/15/24 1304 7 BP    09/15/24 1110 12 BP    09/15/24 0946 8 BP    09/15/24 0833 12 BP

## 2024-09-18 PROCEDURE — 99232 SBSQ HOSP IP/OBS MODERATE 35: CPT | Performed by: HOSPITALIST

## 2024-09-18 PROCEDURE — 99232 SBSQ HOSP IP/OBS MODERATE 35: CPT

## 2024-09-18 RX ORDER — CEPHALEXIN 500 MG/1
500 CAPSULE ORAL 3 TIMES DAILY
Qty: 30 CAPSULE | Refills: 0 | Status: SHIPPED | OUTPATIENT
Start: 2024-09-18 | End: 2024-09-28

## 2024-09-18 NOTE — PLAN OF CARE
Assumed care at 1930. A/Ox4, on room air, NSR on tele. Regular diet. Ambulates, voids. CIWA protocol. Seizure precautions in place. PRN ativan given per mar. PRN seroquel given per mar. No PIV access, MD aware. Patient updated with plan of care. All needs met at this time.     Problem: Patient/Family Goals  Goal: Patient/Family Long Term Goal  Description: Patient's Long Term Goal: discharge    Interventions:  - follow plan of care  - See additional Care Plan goals for specific interventions  Outcome: Progressing  Goal: Patient/Family Short Term Goal  Description: Patient's Short Term Goal: pain control    Interventions:   - PRN tylenol  - See additional Care Plan goals for specific interventions  Outcome: Progressing

## 2024-09-18 NOTE — CONSULTS
[facility]  Report of Inpatient Wound Care Consultation    Lakia Aden Patient Status:  Inpatient    8/10/1995 MRN [id number]   Location [facility] 3NE-A Attending Avery Vasquez MD   Hosp Day # 3 PCP Tim Carroll MD     Reason for Consultation:  RLE surgical wounds per Ortho    History of Present Illness:  Lakia Aden is a a(n) 29 year old female. Patient seen at bedside for multiple skin issues to right lower extremity, hx of fracture /surgery.Complains of dull pain on the lower leg.Surgery on the leg was done on Aug 6th of this year,incision sites opened up few weeks after surgery per patient.        History:  Past Medical History:    Anxiety    Bipolar affective (HCC)    Depression     Past Surgical History:   Procedure Laterality Date    Fracture surgery        reports that she has never smoked. She has never used smokeless tobacco. She reports current alcohol use. She reports that she does not currently use drugs after having used the following drugs: benzodiazepines. Frequency: 5.00 times per week.      Allergies:  @ALLERGY    Laboratory Data:    Recent Labs   Lab 09/15/24  0826 09/15/24  2113   WBC 10.8  --    HGB 11.7*  --    HCT 35.0  --    .0  --    CREATSERUM 0.80  --    BUN 11  --    GLU 98  --    CA 9.2  --    ALB 3.7  --    TP 7.8  --    ETOH 108*  --    PGLU  --  192*         ASSESSMENT:  Wound 24 Leg Right;Medial;Lower (Active)   Date First Assessed/Time First Assessed: 24 1519   Location: Leg  Wound Location Orientation: Right;Medial;Lower      Assessments 2024  3:20 PM   Wound Image     Drainage Amount Small   Drainage Description Serosanguineous   Wound Length (cm) 1 cm   Wound Width (cm) 0.4 cm   Wound Surface Area (cm^2) 0.4 cm^2   Wound Depth (cm) 0.2 cm   Wound Volume (cm^3) 0.08 cm^3   Margins Well-defined edges   Non-staged Wound Description Full thickness   Shantal-wound Assessment Edema;Painful   Wound Granulation Tissue Pink;Firm   Wound Bed Granulation  (%) 100 %   Wound Odor None       Wound 09/18/24 Leg Proximal;Right (Active)   Date First Assessed/Time First Assessed: 09/18/24 1525   Location: Leg  Wound Location Orientation: Proximal;Right      Assessments 9/18/2024  3:26 PM   Wound Image     Drainage Amount None   Wound Length (cm) 1 cm   Wound Width (cm) 0.3 cm   Wound Surface Area (cm^2) 0.3 cm^2   Wound Depth (cm) 0 cm   Wound Volume (cm^3) 0 cm^3   Margins Attached edges   Shantal-wound Assessment Edema;[place]   Wound Bed Eschar (%) 100 %   Wound Odor None       Wound 09/18/24 Knee Anterior;Right (Active)   Date First Assessed/Time First Assessed: 09/18/24 1529   Location: Knee  Wound Location Orientation: Anterior;Right      Assessments 9/18/2024  3:29 PM   Wound Image     Drainage Amount None   Wound Length (cm) 0.8 cm   Wound Width (cm) 0.4 cm   Wound Surface Area (cm^2) 0.32 cm^2   Wound Depth (cm) 0 cm   Wound Volume (cm^3) 0 cm^3   Margins Attached edges   Shantal-wound Assessment Edema   Wound Bed Eschar (%) 100 %   Wound Odor None       Wound 09/18/24 Heel Right (Active)   Date First Assessed/Time First Assessed: 09/18/24 1531   Primary Wound Type: Pressure Injury  Location: Heel  Wound Location Orientation: Right  Wound Description (Comments): Blister from the splint and\" not moving the leg\" per patient      Assessments 9/18/2024  3:32 PM   Wound Image     Drainage Amount None   Wound Length (cm) 0.4 cm   Wound Width (cm) 0.4 cm   Wound Surface Area (cm^2) 0.16 cm^2   Wound Depth (cm) 0 cm   Wound Volume (cm^3) 0 cm^3   Margins Attached edges   Shantal-wound Assessment Dry;Callous   Wound Bed Eschar (%) 100 %   Wound Odor None   Pressure Injury Stage Unstageable   Local pulse: weak, dorsalis pedis  Capillary refill < 3 seconds  Temperature : warm     Wound Cleaning and Dressings:  1.Right medial lower leg  Wound cleansing:  Cleanse with saline  Wound product: Silver foam  Dressing change frequency:  Change dressing every other day and/or as needed    2.Right  anterior knee, right proximal leg  Wound cleansing:  Cleanse with saline  Wound product: Honey gel.Cover with border gauze or a bordered foam if available  Dressing change frequency:  Change dressing daily and prn    3.Right heel   Wound cleansing:  Cleanse with saline  Wound product: Paint with betadine.Cover with border gauze  Dressing change frequency:  Change dressing daily and prn      Compression Therapy  Elevate leg as much as possible above or at the level of the heart      Miscellaneous/Additional Orders:  Offloading: Patient has been wearing crutches,off load heels/off loading heel boots, turn every 2 hours or as needed- pt needs to be reminded to prevent further skin breakdown    Miscellaneous orders: Increase dietary protein intake to promote wound healing    Care Summary:  Care Summary: Discussed Plan of Care at beside with patient. Patient verbally acknowledges understanding of all instructions and all questions were answered.      Additional Notes:   Continue to follow up with ortho/surgery  May need to follow up at the wound clinic if wounds do not improve with the current treatment.    Thank you for this consultation and for allowing me to participate in the care of your patient.      Time Spent 40 minutes.    Thank you,  Iliana JOYNERN RN [facility]  Wound Care Clinician  [facility] Wound Care Team  9/18/2024  4:00 PM

## 2024-09-18 NOTE — PROGRESS NOTES
[facility]  Report of Psychiatric Progress Note    Lakia Aden Patient Status:  Inpatient    8/10/1995 MRN [id number]   Location [facility] 3NE-A Attending Avery Vasquez MD   Hosp Day # 3 PCP Tim Carroll MD     Date of Admission: 9/15/2024  Date of Service: 2024  Reason for Consultation: SI/ETOH    Impression:    Primary Psychiatric Diagnosis:  Suicidal ideation, passive. No plan or intent    Depressive disorder, unspecified    Anxiety disorder, unspecified    Alcohol use disorder, severe    Alcohol withdrawal syndrome, without complications at this time    Rule Out Diagnoses:  MDD  Bipolar  SHARLENE  Substance induced depression/anxiety    Personality Traits:  Deferred     Pertinent Medical Diagnoses:  Right tib/fib fracture, hyponatremia, UTI    Recommendations:  Patient would benefit from inpatient dual psychiatric treatment. She currently has passive SI but concerned that if she was to return home that she is unsure she would keep self safe, especially if she was to relapse. She is waning treatment at this time and has signed for voluntarily.  No need for 1:1 sitter at this time. She is able to contract safety while in hospital.   Continue CIWA protocol with seizure precautions.  Continue on Seroquel 50 mg nightly as needed for sleep.   Transfer to inpatient psychiatric hospital once medically cleared and placement has been established.     LINDSAY Pulliam NP-C    History of Present Illness:  Patient presented to [facility] on 9/15/2024 do to noted reported binge on ETOH for the \"last week\". She reported that she was drinking 5th of vodka daily. She reported that she was interested in detox and also had SI with no plan. Patient had a  level of care assessment completed at [facility] and it was determined that patient was in need of inpatient psychiatric hospitalizations. Discharge planner attempted to find patient placement but was unable to established due to \"acuity\" or \"being  out of scope\". She was admitted the med/psych for further monitoring of detox symptoms.    Patient reports that currently having some passive SI. No plan or intent. She is able to contract for safety at this moment but has concerns that if she was to discharge home that she could develop SI with plan if withdrawal symptoms worsen OR if she was to relapse on ETOH. She reports that she has been experiencing depressive symptoms of feeling hopeless, helpless, and worthless. Having issues with concentration and motivation. Reports appetite fluctuates. Reports insomnia occurs and had not been able to sleep for the last 2 days prior to admission. Reports insomnia is mostly related to ruminations. Reports that she is \"a worrier\". Ruminations are mostly about her future. Reports racing thoughts. Denies daily panic attacks. Reports symptoms of chest tightness and cannot breathe. Denies current manic symptoms. Denies current psychosis symptoms. Does have history of hallucinations and paranoia when going through ETOH withdrawal.    Discussed dispositions options. Patient at this time is verbalizing that she is interested in Dual Inpatient Psychiatric Hospitalization. Will as [facility]/Discharge planner to work on assisting with inpatient psychiatric placement once medically cleared.     Interval History  9/17/2024 - Patient is noted to appear more comfortable this morning. Has needed two doses of Ativan today. She continues to express interest in Dual inpatient psychiatric treatment to treat ETOH use, depression, anxiety, and passive SI. She denies any current SI but continues to have fears that this will change if she discharges from the hospital. She reports that she had poor sleep last night. Previously on Trazodone and felt no benefit from it. Did report previously took Seroquel at night that aided her in sleep. Agreeable to start this PRN in the hospital.     9/18/2024 - Appears that patient is having minimal withdrawal  symptoms. Has only received few doses of Ativan. She continues to express concern that is she was to be discharge home that she would be at risk of hurting self. [facility]/Discharge planner will continue to look for placement for patient. Ortho consult was completed which indicated that she does need to continue need for crutches or wheelchair since only being weight bearing about 25-50%. This likely will pose issues with placement since most facilities will require her to have 1:1.     Past Psychiatric History:  Reports previous inpatient psychiatric hospitalization, last one noted was at [facility] in January 2024. Reports attended PHP through [facility]. No current outpatient psychiatrist or therapist. Denies current psychiatric medications. Reports history of attempt by overdose. Reports previous SIB by cutting, last about a year ago.    Substance Use History:  ETOH - reports misuse since around the age of 22. She reports that she will binge drink. Periods of abstinence is from a few days to the longest being a month. Reports headache, nausea, insomnia, and tremors as withdrawal symptoms. Denies history of DTs and withdrawal seizures at this time but appears she previously had reported these. Will drink about 1/5 of hard liquor.  on arrival to ER. Has been a previous residential/detox facilities.     Benzodiazepines - Xanax - she reports she is not prescribed this. Had been taking up to 10 mg daily. Reports daily use is down to about 2 mg daily. Last use was a few days ago.    Denies any other substance use.     UDS was + for Benzodiazepines and Cocaine.     Psych Family History:  Denies.    Social and Developmental History:   Patient reports that she lives with mother, 2 brothers, and 1 sister. Reports that she is the middle child. He father previously passed away from lung cancer. She reports that she is on leave of absence right now from [employer]. Has been getting around with crutches. Is note driving. Denies legal  issues.     Past Medical History:    Anxiety    Bipolar affective (HCC)    Depression     Past Surgical History:   Procedure Laterality Date    Fracture surgery       History reviewed. No pertinent family history.   reports that she has never smoked. She has never used smokeless tobacco. She reports current alcohol use. She reports that she does not currently use drugs after having used the following drugs: benzodiazepines. Frequency: 5.00 times per week.    Allergies:  Allergies   Allergen Reactions    Metoclopramide OTHER (SEE COMMENTS)     Pt states face becomes \"numb\" and unable to move hands    Sensitivity to reglan makes hands and mouth contract       Medications:  Current Facility-Administered Medications:     ondansetron (Zofran) tab 4 mg, 4 mg, Oral, Q8H PRN    QUEtiapine (SEROquel) tab 50 mg, 50 mg, Oral, Nightly PRN    cephALEXin (Keflex) cap 500 mg, 500 mg, Oral, TID    sodium chloride 0.9% infusion, , Intravenous, Continuous    LORazepam (Ativan) tab 1 mg, 1 mg, Oral, Q1H PRN **OR** LORazepam (Ativan) tab 2 mg, 2 mg, Oral, Q1H PRN    thiamine (Vitamin B1) tab 100 mg, 100 mg, Oral, Daily    multivitamin with minerals (Thera M Plus) tab 1 tablet, 1 tablet, Oral, Daily    folic acid (Folvite) tab 1 mg, 1 mg, Oral, Daily    metoprolol tartrate (Lopressor) tab 25 mg, 25 mg, Oral, BID PRN    acetaminophen (Tylenol Extra Strength) tab 500 mg, 500 mg, Oral, Q4H PRN    melatonin tab 3 mg, 3 mg, Oral, Nightly PRN    glycerin-hypromellose- (Artificial Tears) 0.2-0.2-1 % ophthalmic solution 1 drop, 1 drop, Both Eyes, QID PRN    sodium chloride (Saline Mist) 0.65 % nasal solution 1 spray, 1 spray, Each Nare, Q3H PRN    enoxaparin (Lovenox) 40 MG/0.4ML SUBQ injection 40 mg, 40 mg, Subcutaneous, Daily    Review of Systems   Psychiatric/Behavioral:  Positive for depression, substance abuse and suicidal ideas. Negative for hallucinations and memory loss. The patient is nervous/anxious and has insomnia.    All  other systems reviewed and are negative.    Psychiatry Review Systems: No voices or visions. No elevated mood, racing thoughts, decreased need for sleep, grandiose thoughts, increased participation in risky behaviors, or history of trauma.     Mental Status Exam:     Risk Assessment  Suicidal ideation: passive thoughts of death / no suicidal ideation  Homicidal ideation: None noted    Appearance: disheveled  Behavior: unremarkable  Attitude: cooperative and consistent  Gait: Not observed    Speech: normal rate, rhythm and volume    Mood: sad, depressed, anxious, guilty, and hurt  Affect: Restricted    Thought process: logical  Thought content: ruminations  Perceptions: no hallucinations  Associations: Intact    Orientation: Alert and oriented x 4  Attention and Concentration:   focused and attentive  Memory:  intact immediate, recent, remote  Language: Intact naming and repetition  Fund of Knowledge: Able to recite name of [place] president    Insight: Limited   Judgment: Limited     Objective    Vitals:    09/18/24 1200   BP: (!) 123/97   Pulse: 116   Resp: 15   Temp: 98.2 °F (36.8 °C)     Patient Strengths/Assets:  Caring     Suicide Risk Assessments:  Overall level of suicide risk is moderate to high     Risk Factors: history of attempt by overdose, substance use     Environmental Factors: no outpatient psychiatric providers    Protective Factors: family    Laboratory Data:         STUDIES:    Daya MONTOYA NP-C

## 2024-09-18 NOTE — PLAN OF CARE
The patient is A/Ox4   On RA, no SOB  Tele - NSR, ST  Vitals Stable  Afebrile  C/O of R leg pain, ice applied PRN  Wound nurse consulted for surgical incision wounds, see pictures   Psych is following   CIWA protocol, Ativan given x1 per MAR  Per ortho, the patient can use walker or wheelchair as alternative to crutches. Weight bearing 25-50% on R leg per Dr. Desai  Safety precautions in place. Staff will continue to monitor.               Problem: Patient/Family Goals  Goal: Patient/Family Long Term Goal  Description: Patient's Long Term Goal: discharge    Interventions:  - follow plan of care  - See additional Care Plan goals for specific interventions  Outcome: Progressing  Goal: Patient/Family Short Term Goal  Description: Patient's Short Term Goal: pain control    Interventions:   - PRN tylenol  - See additional Care Plan goals for specific interventions  Outcome: Progressing     Problem: RISK FOR INFECTION - ADULT  Goal: Absence of fever/infection during anticipated neutropenic period  Description: INTERVENTIONS  - Monitor WBC  - Administer growth factors as ordered  - Implement neutropenic guidelines  Outcome: Progressing     Problem: SAFETY ADULT - FALL  Goal: Free from fall injury  Description: INTERVENTIONS:  - Assess pt frequently for physical needs  - Identify cognitive and physical deficits and behaviors that affect risk of falls.  - [place] fall precautions as indicated by assessment.  - Educate pt/family on patient safety including physical limitations  - Instruct pt to call for assistance with activity based on assessment  - Modify environment to reduce risk of injury  - Provide assistive devices as appropriate  - Consider OT/PT consult to assist with strengthening/mobility  - Encourage toileting schedule  Outcome: Progressing     Problem: DISCHARGE PLANNING  Goal: Discharge to home or other facility with appropriate resources  Description: INTERVENTIONS:  - Identify barriers to discharge w/pt and  caregiver  - Include patient/family/discharge partner in discharge planning  - Arrange for needed discharge resources and transportation as appropriate  - Identify discharge learning needs (meds, wound care, etc)  - Arrange for interpreters to assist at discharge as needed  - Consider post-discharge preferences of patient/family/discharge partner  - Complete POLST form as appropriate  - Assess patient's ability to be responsible for managing their own health  - Refer to Case Management Department for coordinating discharge planning if the patient needs post-hospital services based on physician/LIP order or complex needs related to functional status, cognitive ability or social support system  Outcome: Progressing
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
## 2024-09-18 NOTE — PAYOR COMM NOTE
--------------  CONTINUED STAY REVIEW  9/17-9/18  Payor: DAVID  Subscriber #:  [id number]  Authorization Number: [id number]    Admit date: 9/15/24  Admit time:  9:09 PM    REVIEW DOCUMENTATION:    9/17      Subjective:  Mild nausea  No other issues            Objective:  Review of Systems:   A comprehensive review of systems was completed; pertinent positive and negatives stated in subjective.  Vital signs:  Temp:  [97.6 °F (36.4 °C)-98.2 °F (36.8 °C)] 98.2 °F (36.8 °C)  Pulse:  [] 81  Resp:  [15-18] 16  BP: (114-131)/() 128/87  SpO2:  [96 %-99 %] 96 %  Physical Exam:    General: No acute distress   Respiratory: no wheezes, no rhonchi  Cardiovascular: S1, S2, RRR  Abdomen: Soft, NT/ND, +BS  Extremities: no edema, RLE surgical incision looks good       #Etoh abuse/withdrawal  -CIWA protocol  -IVF, thiamine, folic acid     #Right tib/fib fracture 1 month ago on crutches      #Suicidal ideation. H/o suicide attempt. She has no plan and denies any suicidal thoughts to me this AM   -Psychiatry evaluation appreciated      #Depression/anxiety/bipolar  -Not on medication at this time  -Per psychiatry     #Hyponatremia, mild     #Fever/UTI- abx           9/18    Subjective:  Waiting on placement            Objective:  Review of Systems:   A comprehensive review of systems was completed; pertinent positive and negatives stated in subjective.  Vital signs:  Temp:  [97.9 °F (36.6 °C)-98.3 °F (36.8 °C)] 98.1 °F (36.7 °C)  Pulse:  [] 74  Resp:  [10-16] 12  BP: (107-128)/() 122/89  SpO2:  [97 %-98 %] 98 %  Physical Exam:    General: No acute distress   Respiratory: no wheezes, no rhonchi  Cardiovascular: S1, S2, RRR  Abdomen: Soft, NT/ND, +BS  Extremities: no edema, RLE surgical incision wound.       #Etoh abuse/withdrawal  -CIWA protocol  -IVF, thiamine, folic acid     #Right tib/fib fracture 1 month ago on crutches -Ortho consult much appreciated. 25-50% weightbearing and requires crutches etc.   -wound  care consult   -cont keflex     #Suicidal ideation. H/o suicide attempt. She has no plan and denies any suicidal thoughts to me this AM   -Psychiatry evaluation appreciated      #Depression/anxiety/bipolar  -Not on medication at this time  -Per psychiatry     #Hyponatremia, mild     #Fever/UTI- abx          9/18 Psych    Recommendations:  Patient would benefit from inpatient dual psychiatric treatment. She currently has passive SI but concerned that if she was to return home that she is unsure she would keep self safe, especially if she was to relapse. She is waning treatment at this time and has signed for voluntarily.  No need for 1:1 sitter at this time. She is able to contract safety while in hospital.   Continue CIWA protocol with seizure precautions.  Continue on Seroquel 50 mg nightly as needed for sleep.   Transfer to inpatient psychiatric hospital once medically cleared and placement has been established.       MEDICATIONS ADMINISTERED IN LAST 1 DAY:  cephALEXin (Keflex) cap 500 mg       Date Action Dose Route User    9/18/2024 0838 Given 500 mg Oral Bertrand Conner RN    9/17/2024 2104 Given 500 mg Oral Bonnevier, Taylor, RN    9/17/2024 1720 Given 500 mg Oral Orlin Avilez RN          enoxaparin (Lovenox) 40 MG/0.4ML SUBQ injection 40 mg       Date Action Dose Route User    9/18/2024 0839 Given 40 mg Subcutaneous (Left Lower Abdomen) Bertrand Conner RN          folic acid (Folvite) tab 1 mg       Date Action Dose Route User    9/18/2024 0838 Given 1 mg Oral Bertrand Conner RN          LORazepam (Ativan) tab 1 mg       Date Action Dose Route User    9/18/2024 1202 Given 1 mg Oral Bertrand Conner RN    9/17/2024 2104 Given 1 mg Oral Bonnevier, Taylor, RN          QUEtiapine (SEROquel) tab 50 mg       Date Action Dose Route User    9/17/2024 2200 Given 50 mg Oral Bonnevier, Taylor, RN          multivitamin with minerals (Thera M Plus) tab 1 tablet       Date Action Dose Route User     9/18/2024 0838 Given 1 tablet Oral Bertrand Conner RN          thiamine (Vitamin B1) tab 100 mg       Date Action Dose Route User    9/18/2024 0839 Given 100 mg Oral Bertrand Conner RN            Vitals (last day)       Date/Time Temp Pulse Resp BP SpO2 Weight O2 Device O2 Flow Rate (L/min) [facility]    09/18/24 1200 98.2 °F (36.8 °C) 116 15 123/97 99 % -- None (Room air) --     09/18/24 1000 98 °F (36.7 °C) 85 14 120/91 97 % -- None (Room air) --     09/18/24 0800 98.1 °F (36.7 °C) 74 12 122/89 98 % -- None (Room air) --     09/18/24 0600 -- 73 -- 115/92 -- -- -- --     09/18/24 0400 97.9 °F (36.6 °C) 72 12 107/84 97 % -- None (Room air) -- ME    09/18/24 0200 -- 87 -- 112/83 -- -- -- --     09/18/24 0000 98.2 °F (36.8 °C) 90 14 114/83 98 % -- None (Room air) -- ME    09/17/24 2200 -- -- -- 126/94 -- -- -- --     09/17/24 2200 -- 83 -- -- -- -- -- --     09/17/24 2100 -- 101 -- -- -- -- -- --     09/17/24 2000 98.1 °F (36.7 °C) 93 10 128/101 -- -- None (Room air) -- ME    09/17/24 2000 -- -- -- -- 98 % -- -- --     09/17/24 1600 98.1 °F (36.7 °C) 87 16 117/82 98 % -- -- --     09/17/24 1200 98.3 °F (36.8 °C) 91 14 122/88 97 % -- -- --     09/17/24 0800 98.5 °F (36.9 °C) 73 16 119/88 97 % -- None (Room air) --     09/17/24 0600 -- 81 -- 128/87 -- -- -- --     09/17/24 0400 98.2 °F (36.8 °C) 88 16 114/86 96 % -- None (Room air) --     09/17/24 0200 -- 71 -- 121/95 -- -- -- --     09/17/24 0000 -- 79 16 120/92 99 % -- None (Room air) --     09/17/24 0000 98.1 °F (36.7 °C) -- -- -- -- -- -- -- AM          CIWA Scores (since admission)       Date/Time CIWA-Ar Total [facility]    09/18/24 1400 0 AM    09/18/24 1200 7 AM    09/18/24 1000 0 AM    09/18/24 0800 0 AM    09/18/24 0600 2     09/18/24 0400 2     09/18/24 0200 3     09/18/24 0000 3     09/17/24 2200 4 TB    09/17/24 2100 7 TB    09/17/24 2000 6     09/17/24 1800 2 ST    09/17/24 1600 2 ST    09/17/24 1400 3     09/17/24 1200  4 ST    09/17/24 1000 9 ST    09/17/24 0600 3 JH    09/17/24 0400 7 JH    09/17/24 0357 7 JH    09/17/24 0200 3 JH    09/17/24 0000 4 JH    09/16/24 2200 5 JH    09/16/24 2000 4 JH    09/16/24 1800 5 LV    09/16/24 1600 5 LV    09/16/24 1400 8 LV    09/16/24 1200 5 LV    09/16/24 1000 5 LV    09/16/24 0800 9 AG    09/16/24 0600 4 JA    09/16/24 0400 4 JA    09/16/24 0200 5 JA    09/16/24 0000 5 JA    09/15/24 2200 9 JA    09/15/24 1800 9 BS    09/15/24 1630 11 BS    09/15/24 1507 6 BP    09/15/24 1304 7 BP    09/15/24 1110 12 BP    09/15/24 0946 8 BP    09/15/24 0833 12 BP

## 2024-09-19 VITALS
WEIGHT: 142 LBS | HEART RATE: 101 BPM | RESPIRATION RATE: 15 BRPM | DIASTOLIC BLOOD PRESSURE: 75 MMHG | SYSTOLIC BLOOD PRESSURE: 114 MMHG | OXYGEN SATURATION: 97 % | HEIGHT: 67 IN | BODY MASS INDEX: 22.29 KG/M2 | TEMPERATURE: 98 F

## 2024-09-19 PROBLEM — S82.401A TIBIA/FIBULA FRACTURE, RIGHT, CLOSED, INITIAL ENCOUNTER: Status: ACTIVE | Noted: 2024-09-19

## 2024-09-19 PROBLEM — F10.90 ALCOHOL USE DISORDER: Status: ACTIVE | Noted: 2024-09-19

## 2024-09-19 PROBLEM — S82.201A TIBIA/FIBULA FRACTURE, RIGHT, CLOSED, INITIAL ENCOUNTER: Status: ACTIVE | Noted: 2024-09-19

## 2024-09-19 PROBLEM — T81.89XA DELAYED SURGICAL WOUND HEALING: Status: ACTIVE | Noted: 2024-09-19

## 2024-09-19 PROCEDURE — 99239 HOSP IP/OBS DSCHRG MGMT >30: CPT | Performed by: HOSPITALIST

## 2024-09-19 PROCEDURE — 99253 IP/OBS CNSLTJ NEW/EST LOW 45: CPT | Performed by: ORTHOPAEDIC SURGERY

## 2024-09-19 PROCEDURE — 99232 SBSQ HOSP IP/OBS MODERATE 35: CPT

## 2024-09-19 RX ORDER — QUETIAPINE FUMARATE 50 MG/1
75 TABLET, FILM COATED ORAL NIGHTLY PRN
Qty: 45 TABLET | Refills: 0 | Status: SHIPPED | OUTPATIENT
Start: 2024-09-19
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
## 2024-09-19 NOTE — DISCHARGE INSTRUCTIONS
Please follow up with Dr. Araiza ortho surgeon within the week at [facility].    Wound care instructions:    1.Right medial lower leg   Wound cleansing:  Cleanse with saline   Wound product: Silver foam   Dressing change frequency:  Change dressing every other day and/or as needed     2.Right anterior knee, right proximal leg   Wound cleansing:  Cleanse with saline   Wound product: Honey gel.Cover with bordered gauze or a bordered foam if available   Dressing change frequency:  Change dressing daily and prn     3.Right heel   Wound cleansing:  Cleanse with saline   Wound product: Paint with betadine.Cover with bordered gauze   Dressing change frequency:  Change dressing daily and prn

## 2024-09-19 NOTE — PROGRESS NOTES
[facility]   part of [facility]     Hospitalist Progress Note     Lakia Aden Patient Status:  Inpatient    8/10/1995 MRN [id number]   Location [facility] 3NE-A Attending Avery Vasquez MD   Hosp Day # 4 PCP Tim Carroll MD     Subjective:   No issues     Objective:    Review of Systems:   A comprehensive review of systems was completed; pertinent positive and negatives stated in subjective.  Vital signs:  Temp:  [97.3 °F (36.3 °C)-98.3 °F (36.8 °C)] 97.3 °F (36.3 °C)  Pulse:  [] 83  Resp:  [11-20] 11  BP: (108-129)/(71-99) 121/92  SpO2:  [97 %-100 %] 100 %  Physical Exam:    General: No acute distress   Respiratory: no wheezes, no rhonchi  Cardiovascular: S1, S2, RRR  Abdomen: Soft, NT/ND, +BS  Extremities: no edema, RLE surgical incision wound.     Diagnostic Data:    Labs:  Recent Labs   Lab 09/15/24  0826   WBC 10.8   HGB 11.7*   MCV 90.4   .0     Recent Labs   Lab 09/15/24  0826   GLU 98   BUN 11   CREATSERUM 0.80   CA 9.2   ALB 3.7   *   K 3.5      CO2 26.0   ALKPHO 107*   AST 20   ALT 20   BILT 0.9   TP 7.8     Estimated Creatinine Clearance: 101.8 mL/min (based on SCr of 0.8 mg/dL).  No results for input(s): \"PTP\", \"INR\" in the last 168 hours.     Microbiology  No results found for this visit on 09/15/24.  Imaging: Reviewed in Epic.  Medications:    cephalexin  500 mg Oral TID    thiamine  100 mg Oral Daily    multivitamin with minerals  1 tablet Oral Daily    folic acid  1 mg Oral Daily    enoxaparin  40 mg Subcutaneous Daily       Assessment & Plan:    #ETOH abuse/withdrawal  -[facility] protocol  -IVF, thiamine, folic acid     #Right tib/fib fracture 1 month ago on crutches -Ortho consult much appreciated. 25-50% weightbearing and requires crutches etc.   -wound care consulted per Ortho   -cont Keflex     #Suicidal ideation. H/o suicide attempt. She has no plan and denies any suicidal thoughts to me this AM   -Psychiatry evaluation appreciated       #Depression/anxiety/bipolar  -Not on medication at this time  -Per psychiatry     #Hyponatremia, mild    #Fever/UTI- abx     DC planning  Medically cleared- working on psych dispo         Supplementary Documentation:   Quality:  DVT Mechanical Prophylaxis:   SCDs,    DVT Pharmacologic Prophylaxis   Medication    enoxaparin (Lovenox) 40 MG/0.4ML SUBQ injection 40 mg                Code Status: Full Code  Em: No urinary catheter in place  Em Duration (in days):   Central line:    LESLIE: 9/19/2024  At this point Ms. Aden is expected to be discharge to: d     The 21st Century Cures Act makes medical notes like these available to patients in the interest of transparency. Please be advised this is a medical document. Medical documents are intended to carry relevant information, facts as evident, and the clinical opinion of the practitioner. The medical note is intended as peer to peer communication and may appear blunt or direct. It is written in medical language and may contain abbreviations or verbiage that are unfamiliar.

## 2024-09-19 NOTE — PLAN OF CARE
Pt. A&O x4, on RA, NSR to ST on tele. VSS. Up with crutches/standby. No c/o pain. CIWAs Q2. No IV- MD aware. Seizure, fall, and safety precautions in place. Plan of care ongoing.     Problem: RISK FOR INFECTION - ADULT  Goal: Absence of fever/infection during anticipated neutropenic period  Description: INTERVENTIONS  - Monitor WBC  - Administer growth factors as ordered  - Implement neutropenic guidelines  Outcome: Progressing     Problem: SAFETY ADULT - FALL  Goal: Free from fall injury  Description: INTERVENTIONS:  - Assess pt frequently for physical needs  - Identify cognitive and physical deficits and behaviors that affect risk of falls.  - Manor fall precautions as indicated by assessment.  - Educate pt/family on patient safety including physical limitations  - Instruct pt to call for assistance with activity based on assessment  - Modify environment to reduce risk of injury  - Provide assistive devices as appropriate  - Consider OT/PT consult to assist with strengthening/mobility  - Encourage toileting schedule  Outcome: Progressing     Problem: DISCHARGE PLANNING  Goal: Discharge to home or other facility with appropriate resources  Description: INTERVENTIONS:  - Identify barriers to discharge w/pt and caregiver  - Include patient/family/discharge partner in discharge planning  - Arrange for needed discharge resources and transportation as appropriate  - Identify discharge learning needs (meds, wound care, etc)  - Arrange for interpreters to assist at discharge as needed  - Consider post-discharge preferences of patient/family/discharge partner  - Complete POLST form as appropriate  - Assess patient's ability to be responsible for managing their own health  - Refer to Case Management Department for coordinating discharge planning if the patient needs post-hospital services based on physician/LIP order or complex needs related to functional status, cognitive ability or social support system  Outcome:  Progressing

## 2024-09-19 NOTE — DIETARY NOTE
[facility]   part of [facility]   CLINICAL NUTRITION    Lakia Aden     Admitting diagnosis:  Suicidal ideation [R45.851]  Acute cystitis with hematuria [N30.01]  Alcohol withdrawal syndrome without complication (HCC) [F10.930]    Ht: 170.2 cm (5' 7\")  Wt: 64.4 kg (142 lb).   Body mass index is 22.24 kg/m².  IBW: 61.4 kg    Wt Readings from Last 6 Encounters:   09/15/24 64.4 kg (142 lb)   01/25/24 63.5 kg (140 lb)   12/09/23 63.5 kg (140 lb)   04/04/23 63.5 kg (140 lb)   02/11/23 63.5 kg (140 lb)   01/31/23 63.5 kg (140 lb)        Labs/Meds reviewed    Diet:       Procedures    Regular/General diet Is Patient on Accuchecks? No; Is Patient on Suicide Precautions? Yes     Percent Meals Eaten (last 3 days)       None              Pt chart reviewed d/t wounds. 29/F admitted d/t worsen pain from leg injury. Pt reported per appetite lately has been good and east 2x meals/day. Claimed she is feeling a little constipated because she normally has a good BM every day. Only had small hard one today. No complaints of N/V/D. Pt was encouraged to eat high protein foods to help with wound healing. Was given hand out of foods containing protein. Reported she had eggs and yogurt for breakfast, did not want ONS. All questions answered at time of visit. Pt is planning to be admitted into alcohol detox program d/t hx of alcohol abuse.     PMH: etoh abuse, recent tib/fib fracture, anxiety, bipolar     Patient reports good appetite at this time.  Nursing notes reports   intake for last meal.  Tolerating po diet without diarrhea, emesis, or constipation.   No significant weight changes noted.     Patient is at low nutrition risk at this time.    Please consult if patient status changes or nutrition issues arise.    Myles Mukherjee-Luis  Dietetic Intern  [phone]

## 2024-09-19 NOTE — BH PROGRESS NOTE
Adult PHP referrals    [facility]  [address] [place] [address]  [phone] ext. [phone]    You may present to their walk-in intake department 7 days a week between the hours of 8 AM and 7 PM for evaluation for partial hospitalization program.    [facility]  [address] [place] [address]  Phone: [phone]     We offer Partial Hospitalization Programs (PHP) for both adolescents and adults at [facility]. The program is an intense five-day-a-week treatment option for adolescents ages 13-17 and adults 18 and over. The PHP program is designed for individuals who may be experiencing emotional or behavioral conditions which prevent them from functioning in their day to day life, especially in school or at work. Using evidence-based treatment models, participants receive similar therapy and education as a traditional inpatient program.    PHP can be utilized to help patients transition from inpatient care or for those who may need more involved therapy than an intensive outpatient program. Participants will experience group therapy which addresses depression, anxiety, suicidal thoughts, self-harm, trauma, PTSD, and other mood disorders. The goal of therapy is to help patients return to an ideal and more functional level of productivity.    Adult PHP  9:00am-2:30pm  Monday-Friday    For more information, please call [phone].    [facility]  [address] [place] [address]  Phone: [phone]    At [facility] we provide an Intensive Outpatient Program (IOP) that caters to those who wish to receive mental health treatment while maintaining their daily lives. Our intensive outpatient services are beneficial to individuals who are seeking help in overcoming mental health disorders such as:    Depression  Anxiety  Bipolar disorder  Substance use problems, including alcohol, illegal drugs, and  the improper use of prescription or over-the-counter medications  Post-Traumatic Stress Disorder (PTSD)  Psychological trauma & abuse  Treatment Programs at [facility]  Our intensive outpatient program is available Monday through Friday, 9:00 AM to 12:00 PM. Participants attend program three to five days per week. Our groups allow participants to come together and discuss their mental health issues in a constructive and supportive environment. Group therapy topics focus on:    Coping skills  Emotional regulation  Healthy behaviors  Medications  Relationship issues  Patients can access the intensive outpatient program even if they have not previously participated in the inpatient program at [facility].      [facility]  [id number] [address] [phone]  Assessments by appointment only:    Monday-Friday: 9 am-7 pm  Saturday: 9 am-12 pm  Call to schedule a confidential evaluation, 24 hours a day, seven days a week.    [facility], [place], offers comprehensive diagnostic assessment and treatment for emotional health conditions and substance use disorders. Outpatient services focus on helping patients regain healthy, fully functional daily lives.    Our professional staff helps patients address emotional, behavioral, substance use, social, family and work issues through customized programs including evidence-based individual, group and family therapies.

## 2024-09-19 NOTE — CONSULTS
[facility] Inpatient Orthopaedic Consult Note    CC: Right tibia fracture status post IM nailing at [facility] on 8/8/2024    HPI: The patient is a 29 year old female admitted for EtOH abuse/withdrawal, possible suicidal ideation.  She is also recovering approximately 5 weeks postop after undergoing IM nailing of a right tibia and fibula fracture performed at [facility].  She relates no follow-up since her surgical treatment and suture removal by a friend.  She has been protecting the leg with crutches with intermittent pains at the fracture zone.  There has been some drainage from her incisions.  She denies constitutional symptoms, numbness, tingling, knee or ankle pain and swelling.    Past Medical History:    Anxiety    Bipolar affective (HCC)    Depression     Past Surgical History:   Procedure Laterality Date    Fracture surgery       Current Outpatient Medications   Medication Sig Dispense Refill    cephALEXin 500 MG Oral Cap Take 1 capsule (500 mg total) by mouth 3 (three) times daily for 10 days. 30 capsule 0     Allergies   Allergen Reactions    Metoclopramide OTHER (SEE COMMENTS)     Pt states face becomes \"numb\" and unable to move hands    Sensitivity to reglan makes hands and mouth contract     History reviewed. No pertinent family history.  Social History     Occupational History    Not on file   Tobacco Use    Smoking status: Never    Smokeless tobacco: Never   Vaping Use    Vaping status: Never Used   Substance and Sexual Activity    Alcohol use: Yes    Drug use: Not Currently     Frequency: 5.0 times per week     Types: benzodiazepines     Comment: Using Xanax 3-4 times a week, 2-6mg    Sexual activity: Not on file        ROS:  Complete system review obtained and negative except as mentioned above      Physical Exam:    BP (!) 121/92   Pulse 83   Temp 97.3 °F (36.3 °C) (Oral)   Resp 11   Ht 5' 7\" (1.702 m)   Wt 142 lb (64.4 kg)   LMP 08/18/2024 (Approximate)   SpO2 100%   BMI 22.24 kg/m²    Constitutional: Well-developed well-nourished 29-year-old female in no acute distress in her hospital bed  Psychological: Mildly blunted affect alert and oriented x 3  Respiratory: Breathing comfortably on room air  Cardiac: Regular rate and rhythm on the monitor  Right lower extremity: Images of the wounds reviewed in the chart.  No significant soft tissue swelling, erythema or edema about the right lower extremity.  Surgical incisions in various stages of healing and eschar formation.  The most concerning is the distal medial where it does have a wet bed without surrounding erythema but delayed healing.  Adequate range of motion noted about the knee and ankle with an intact distal neurovascular status.      Imaging: AP lateral views right tibia and fibula demonstrates an intramedullary nail with good alignment and no complicating features.  Bony union is not yet seen at the distal third oblique tibial shaft fracture and a transverse proximal third fibular fracture.    XR TIBIA + FIBULA (2 VIEWS), RIGHT (CPT=73590)    Result Date: 9/15/2024  CONCLUSION:   Obliquely oriented fracture through the distal tibial shaft, status post orthopedic fixation via intramedullary gatito and dual proximal and distal interlocking screws; no evidence of hardware complication.  Persistent lucency of the fracture plane compatible with incomplete osseous bridging.  Also noted nondisplaced fracture through the proximal fibular shaft with moderate regional callus formation, though with persistent lucency of the fracture plane compatible with incomplete osseous bridging.  No traumatic malalignment at the knee or ankle.  Mild soft tissue swelling anteriorly along the distal tibial shaft.   LOCATION:  [facility]   Dictated by (CST): Corky Flores MD on 9/15/2024 at 9:46 AM     Finalized by (CST): Corky Flores MD on 9/15/2024 at 9:48 AM       XR ANKLE (MIN 3 VIEWS), RIGHT (CPT=73610)    Result Date: 9/15/2024  CONCLUSION:   Partially visualized  comminuted fracture of the distal tibial shaft, status post orthopedic fixation; no evidence of hardware complication.  Persistent lucency of the fracture plane compatible with incomplete osseous bridging.  Ankle mortise and joint spaces of the hindfoot are preserved.  Soft tissues are within normal limits.   LOCATION:  [place]   Dictated by (CST): Corky Flores MD on 9/15/2024 at 9:45 AM     Finalized by (CST): Corky Flores MD on 9/15/2024 at 9:46 AM        Labs:     Recent Results (from the past 72 hour(s))   Magnesium    Collection Time: 09/17/24  7:05 AM   Result Value Ref Range    Magnesium 1.9 1.6 - 2.6 mg/dL       Assessment/Plan: Right leg IM nailing tibia and fibula fracture on 8/8/2024 with delayed wound healing    Reviewed imaging and exam findings with the patient at the bedside.  Although I do not suspect any complicating features on the x-ray or her bony healing, I am most concerned about her open wounds particularly at the distal medial incision.  There is delayed healing without signs of active infection but she is at risk for deep infection without meticulous wound care.  We appreciate input from the wound care clinic and I emphasized the patient the importance of compliance moving forward.  Silver foam dressing changes are recommended per wound care.  I also agree with oral antibiotics until she can get follow-up with her operative surgeon for reassessment.  Any signs of increasing drainage, redness, warmth or increasing pain needs to be reassessed urgently.  She may be 25 to 50% weightbearing on this right side with pain as her guide.  Follow-up is recommended with her operative surgeon within the next 2 weeks.  All questions were answered at the bedside and the patient verbalized understanding and appreciation.  She may be discharged from an orthopedic standpoint with follow-up as above.      Елена Desai MD  [facility]

## 2024-09-19 NOTE — PROGRESS NOTES
[facility]  Report of Psychiatric Progress Note    Lakia Aden Patient Status:  Inpatient    8/10/1995 MRN [id number]   Location [facility] 3NE-A Attending Avery Vasquez MD   Hosp Day # 4 PCP Tim Carroll MD     Date of Admission: 9/15/2024  Date of Service: 2024  Reason for Consultation: SI/ETOH    Impression:    Primary Psychiatric Diagnosis:  Suicidal ideation, passive. No plan or intent    Depressive disorder, unspecified    Anxiety disorder, unspecified    Alcohol use disorder, severe    Alcohol withdrawal syndrome, without complications at this time    Rule Out Diagnoses:  MDD  Bipolar  SHARLENE  Substance induced depression/anxiety    Personality Traits:  Deferred     Pertinent Medical Diagnoses:  Right tib/fib fracture, hyponatremia, UTI    Recommendations:  Patient would benefit from inpatient dual psychiatric treatment. She currently has passive SI but concerned that if she was to return home that she is unsure she would keep self safe, especially if she was to relapse. She is wanting treatment at this time and has signed for voluntarily.  No need for 1:1 sitter at this time. She is able to contract safety while in hospital.   Continue CIWA protocol with seizure precautions.  Increase Seroquel to 75 mg nightly as needed for sleep.   Transfer to inpatient psychiatric hospital once medically cleared and placement has been established. Will also have [facility] provide patient a list of PHP so patient can begin to reach out in case inpatient psychiatric treatment is not feasible at this time.     ADDENDUM: Patient reports that she feels comfortable discharging tonight and that her mother will be bringing her to [facility] to do a walk in assessment for PHP. She is able to contract for safety.     LINDSAY Pulliam NP-C    History of Present Illness:  Patient presented to [facility] on 9/15/2024 do to noted reported binge on ETOH for the \"last week\". She reported that she was drinking 5th  of vodka daily. She reported that she was interested in detox and also had SI with no plan. Patient had a  level of care assessment completed at [facility] and it was determined that patient was in need of inpatient psychiatric hospitalizations. Discharge planner attempted to find patient placement but was unable to established due to \"acuity\" or \"being out of scope\". She was admitted the med/psych for further monitoring of detox symptoms.    Patient reports that currently having some passive SI. No plan or intent. She is able to contract for safety at this moment but has concerns that if she was to discharge home that she could develop SI with plan if withdrawal symptoms worsen OR if she was to relapse on ETOH. She reports that she has been experiencing depressive symptoms of feeling hopeless, helpless, and worthless. Having issues with concentration and motivation. Reports appetite fluctuates. Reports insomnia occurs and had not been able to sleep for the last 2 days prior to admission. Reports insomnia is mostly related to ruminations. Reports that she is \"a worrier\". Ruminations are mostly about her future. Reports racing thoughts. Denies daily panic attacks. Reports symptoms of chest tightness and cannot breathe. Denies current manic symptoms. Denies current psychosis symptoms. Does have history of hallucinations and paranoia when going through ETOH withdrawal.    Discussed dispositions options. Patient at this time is verbalizing that she is interested in Dual Inpatient Psychiatric Hospitalization. Will as [facility]/Discharge planner to work on assisting with inpatient psychiatric placement once medically cleared.     Interval History  9/17/2024 - Patient is noted to appear more comfortable this morning. Has needed two doses of Ativan today. She continues to express interest in Dual inpatient psychiatric treatment to treat ETOH use, depression, anxiety, and passive SI. She denies any current SI but continues  to have fears that this will change if she discharges from the hospital. She reports that she had poor sleep last night. Previously on Trazodone and felt no benefit from it. Did report previously took Seroquel at night that aided her in sleep. Agreeable to start this PRN in the hospital.     9/18/2024 - Appears that patient is having minimal withdrawal symptoms. Has only received few doses of Ativan. She continues to express concern that is she was to be discharge home that she would be at risk of hurting self. [facility]/Discharge planner will continue to look for placement for patient. Ortho consult was completed which indicated that she does need to continue need for crutches or wheelchair since only being weight bearing about 25-50%. This likely will pose issues with placement since most facilities will require her to have 1:1.     9/19/2024 - Patient reports that she has been feeling a little anxious today. Reports that she is feeling frustrated about not being able to be accepted due to her leg. She denies any current SI but is very anxious of having continued withdrawal symptoms, a seizure, or \"a psych episode\". Discussed other treatment options besides inpatient psychiatric treatment. She is open to [facility]. Will have [facility] provide her a list to explore PHP options. Will continue to look for inpatient psychiatric placement at this time though.     Past Psychiatric History:  Reports previous inpatient psychiatric hospitalization, last one noted was at [place] in January 2024. Reports attended PHP through [facility]. No current outpatient psychiatrist or therapist. Denies current psychiatric medications. Reports history of attempt by overdose. Reports previous SIB by cutting, last about a year ago.    Substance Use History:  ETOH - reports misuse since around the age of 22. She reports that she will binge drink. Periods of abstinence is from a few days to the longest being a month. Reports headache, nausea, insomnia, and  tremors as withdrawal symptoms. Denies history of DTs and withdrawal seizures at this time but appears she previously had reported these. Will drink about 1/5 of hard liquor.  on arrival to ER. Has been a previous residential/detox facilities.     Benzodiazepines - Xanax - she reports she is not prescribed this. Had been taking up to 10 mg daily. Reports daily use is down to about 2 mg daily. Last use was a few days ago.    Denies any other substance use.     UDS was + for Benzodiazepines and Cocaine.     Psych Family History:  Denies.    Social and Developmental History:   Patient reports that she lives with mother, 2 brothers, and 1 sister. Reports that she is the middle child. He father previously passed away from lung cancer. She reports that she is on leave of absence right now from [employer]. Has been getting around with crutches. Is note driving. Denies legal issues.     Past Medical History:    Anxiety    Bipolar affective (HCC)    Depression     Past Surgical History:   Procedure Laterality Date    Fracture surgery       History reviewed. No pertinent family history.   reports that she has never smoked. She has never used smokeless tobacco. She reports current alcohol use. She reports that she does not currently use drugs after having used the following drugs: benzodiazepines. Frequency: 5.00 times per week.    Allergies:  Allergies   Allergen Reactions    Metoclopramide OTHER (SEE COMMENTS)     Pt states face becomes \"numb\" and unable to move hands    Sensitivity to reglan makes hands and mouth contract       Medications:  Current Facility-Administered Medications:     ondansetron (Zofran) tab 4 mg, 4 mg, Oral, Q8H PRN    QUEtiapine (SEROquel) tab 50 mg, 50 mg, Oral, Nightly PRN    cephALEXin (Keflex) cap 500 mg, 500 mg, Oral, TID    sodium chloride 0.9% infusion, , Intravenous, Continuous    LORazepam (Ativan) tab 1 mg, 1 mg, Oral, Q1H PRN **OR** LORazepam (Ativan) tab 2 mg, 2 mg, Oral, Q1H PRN     thiamine (Vitamin B1) tab 100 mg, 100 mg, Oral, Daily    multivitamin with minerals (Thera M Plus) tab 1 tablet, 1 tablet, Oral, Daily    folic acid (Folvite) tab 1 mg, 1 mg, Oral, Daily    metoprolol tartrate (Lopressor) tab 25 mg, 25 mg, Oral, BID PRN    acetaminophen (Tylenol Extra Strength) tab 500 mg, 500 mg, Oral, Q4H PRN    melatonin tab 3 mg, 3 mg, Oral, Nightly PRN    glycerin-hypromellose- (Artificial Tears) 0.2-0.2-1 % ophthalmic solution 1 drop, 1 drop, Both Eyes, QID PRN    sodium chloride (Saline Mist) 0.65 % nasal solution 1 spray, 1 spray, Each Nare, Q3H PRN    enoxaparin (Lovenox) 40 MG/0.4ML SUBQ injection 40 mg, 40 mg, Subcutaneous, Daily    Review of Systems   Psychiatric/Behavioral:  Positive for depression, substance abuse and suicidal ideas. Negative for hallucinations and memory loss. The patient is nervous/anxious and has insomnia.    All other systems reviewed and are negative.    Psychiatry Review Systems: No voices or visions. No elevated mood, racing thoughts, decreased need for sleep, grandiose thoughts, increased participation in risky behaviors, or history of trauma.     Mental Status Exam:     Risk Assessment  Suicidal ideation: passive thoughts of death / no suicidal ideation  Homicidal ideation: None noted    Appearance: disheveled  Behavior: unremarkable  Attitude: cooperative and consistent  Gait: Not observed    Speech: normal rate, rhythm and volume    Mood: sad, depressed, anxious, guilty, and hurt  Affect: Restricted    Thought process: logical  Thought content: ruminations  Perceptions: no hallucinations  Associations: Intact    Orientation: Alert and oriented x 4  Attention and Concentration:   focused and attentive  Memory:  intact immediate, recent, remote  Language: Intact naming and repetition  Fund of Knowledge: Able to recite name of [place] president    Insight: Limited   Judgment: Limited     Objective    Vitals:    09/19/24 0600   BP: 108/71   Pulse: 86   Resp:     Temp:      Patient Strengths/Assets:  Caring     Suicide Risk Assessments:  Overall level of suicide risk is moderate to high     Risk Factors: history of attempt by overdose, substance use     Environmental Factors: no outpatient psychiatric providers    Protective Factors: family    Laboratory Data:         STUDIES:    Daya BLACKMONC

## 2024-09-19 NOTE — PLAN OF CARE
The patient is A/Ox4   On RA, no SOB  Tele - NSR/ST  Vitals Stable  Afebrile  C/O R leg pain, PRN ice packs  Patient denies SI  CIWA protocol  Psych is following  Discharge planning  Safety precautions in place    The patient has been cleared for dc, pts mom will  the patient @ 2000 and will be bringing the patient to [facility] for PHP assessment.           Problem: Patient/Family Goals  Goal: Patient/Family Long Term Goal  Description: Patient's Long Term Goal: discharge    Interventions:  - follow plan of care  - See additional Care Plan goals for specific interventions  Outcome: Progressing  Goal: Patient/Family Short Term Goal  Description: Patient's Short Term Goal: pain control    Interventions:   - PRN tylenol  - See additional Care Plan goals for specific interventions  Outcome: Progressing     Problem: RISK FOR INFECTION - ADULT  Goal: Absence of fever/infection during anticipated neutropenic period  Description: INTERVENTIONS  - Monitor WBC  - Administer growth factors as ordered  - Implement neutropenic guidelines  Outcome: Progressing     Problem: SAFETY ADULT - FALL  Goal: Free from fall injury  Description: INTERVENTIONS:  - Assess pt frequently for physical needs  - Identify cognitive and physical deficits and behaviors that affect risk of falls.  - [place] fall precautions as indicated by assessment.  - Educate pt/family on patient safety including physical limitations  - Instruct pt to call for assistance with activity based on assessment  - Modify environment to reduce risk of injury  - Provide assistive devices as appropriate  - Consider OT/PT consult to assist with strengthening/mobility  - Encourage toileting schedule  Outcome: Progressing     Problem: DISCHARGE PLANNING  Goal: Discharge to home or other facility with appropriate resources  Description: INTERVENTIONS:  - Identify barriers to discharge w/pt and caregiver  - Include patient/family/discharge partner in discharge  planning  - Arrange for needed discharge resources and transportation as appropriate  - Identify discharge learning needs (meds, wound care, etc)  - Arrange for interpreters to assist at discharge as needed  - Consider post-discharge preferences of patient/family/discharge partner  - Complete POLST form as appropriate  - Assess patient's ability to be responsible for managing their own health  - Refer to Case Management Department for coordinating discharge planning if the patient needs post-hospital services based on physician/LIP order or complex needs related to functional status, cognitive ability or social support system  Outcome: Progressing

## 2024-09-20 NOTE — DISCHARGE SUMMARY
[place] HOSPITALIST  DISCHARGE SUMMARY     Lakia Aden Patient Status:  Inpatient    8/10/1995 MRN [id number]   Location [facility] 3NE-A Attending No att. providers found   Hosp Day # 4 PCP Tim Carroll MD     Date of Admission: 9/15/2024  Date of Discharge: 2024    Discharge Disposition: Psychiatric Hospital or Psych Unit    Admitting Diagnosis:   Suicidal ideation [R45.851]  Acute cystitis with hematuria [N30.01]  Alcohol withdrawal syndrome without complication (HCC) [F10.930]    Hospital Discharge Diagnoses:  #ETOH abuse/withdrawal  -[facility] protocol  -IVF, thiamine, folic acid     #Right tib/fib fracture 1 month ago on crutches -Ortho consult much appreciated. 25-50% weightbearing and requires crutches etc.   -wound care consulted per Ortho   -cont Keflex     #Suicidal ideation. H/o suicide attempt. She has no plan and denies any suicidal thoughts to me this AM   -Psychiatry evaluation appreciated      #Depression/anxiety/bipolar  -Not on medication at this time  -Per psychiatry     #Hyponatremia, mild     #Fever/UTI- abx      DC planning  Medically cleared- working on psych dispo  Patient without suicidal ideations on day of discharge and will f/u as outpt with psych per psychiatry team     Lace+ Score: 43  59-90 High Risk  29-58 Medium Risk  0-28   Low Risk.     Brief Synopsis: patient improved from medical standpoint and tx for UTI. She was medically cleared and psychiatry placed on consultation. Extensive efforts from the team to t/f her to psych facility but unable to her recent RLE fx s/p surgical repair. Ortho consulted and pt still warrants crutches at this time as not 100% weightbearing. After exhausting psychiatric resources and agreement with patient and family and psychiatry she will have close f/u as outpt. Psychiatry agreed and patient not suicidal on day of discharge.     Procedures during hospitalization:   None     Lab/Test results pending at Discharge:   None      Consultants:  Psychiatry, Ortho     Discharge Medication List:     Discharge Medications        START taking these medications        Instructions Prescription details   cephALEXin 500 MG Caps  Commonly known as: Keflex  Notes to patient: Antibiotics      Take 1 capsule (500 mg total) by mouth 3 (three) times daily for 10 days.   Stop taking on: September 28, 2024  Quantity: 30 capsule  Refills: 0     QUEtiapine 50 MG Tabs  Commonly known as: SEROquel      Take 1.5 tablets (75 mg total) by mouth nightly as needed (Sleep).   Quantity: 45 tablet  Refills: 0               Where to Get Your Medications        These medications were sent to [id number] - [facility] [address]  AT [facility] [address], [phone], [phone]  [address] [facility], [facility] [address]      Hours: 24-hours Phone: [phone]   QUEtiapine 50 MG Tabs       Please  your prescriptions at the location directed by your doctor or nurse    Bring a paper prescription for each of these medications  cephALEXin 500 MG Caps         Follow-up appointment:   Tim Carroll MD  [address]  [address]  [facility] [id number] [phone]    Schedule an appointment as soon as possible for a visit  Call and make follow up appointment in 1-2 weeks      -----------------------------------------------------------------------------------------------  PATIENT DISCHARGE INSTRUCTIONS: See electronic chart    Avery Vasquez MD 9/20/2024    Time spent: 33 minutes

## 2024-09-20 NOTE — PROGRESS NOTES
NURSING DISCHARGE NOTE    Discharged Other, (see nursing note) via Ambulatory.  Accompanied by Family member  Belongings Returned to patient from safe.    The patient had no Ivs. Tele removed. The patient and mom at the bedside verbalized understanding of the wound care and discharge instruction.

## 2024-09-20 NOTE — PAYOR COMM NOTE
--------------  DISCHARGE REVIEW    Payor: DAVID  Subscriber #:  [id number]  Authorization Number: [id number]    Admit date: 9/15/24  Admit time:   9:09 PM  Discharge Date: 9/19/2024 10:19 PM     Admitting Physician: Robert Eaton MD  Attending Physician:  No att. providers found  Primary Care Physician: Tim Carroll MD

## 2025-07-18 ENCOUNTER — HOSPITAL ENCOUNTER (OUTPATIENT)
Dept: ULTRASOUND IMAGING | Age: 30
Discharge: HOME | End: 2025-07-18
Attending: NURSE PRACTITIONER
Payer: MEDICAID

## 2025-07-18 DIAGNOSIS — R79.89 ELEVATED LFTS: ICD-10-CM
